# Patient Record
Sex: FEMALE | Race: BLACK OR AFRICAN AMERICAN | NOT HISPANIC OR LATINO | URBAN - METROPOLITAN AREA
[De-identification: names, ages, dates, MRNs, and addresses within clinical notes are randomized per-mention and may not be internally consistent; named-entity substitution may affect disease eponyms.]

---

## 2019-06-30 ENCOUNTER — HOSPITAL ENCOUNTER (INPATIENT)
Facility: HOSPITAL | Age: 69
LOS: 2 days | Discharge: HOME | DRG: 854 | End: 2019-07-03
Attending: EMERGENCY MEDICINE | Admitting: INTERNAL MEDICINE
Payer: COMMERCIAL

## 2019-06-30 DIAGNOSIS — N13.4 HYDROURETER: ICD-10-CM

## 2019-06-30 DIAGNOSIS — N13.30 HYDRONEPHROSIS: ICD-10-CM

## 2019-06-30 DIAGNOSIS — N12 PYELONEPHRITIS: Primary | ICD-10-CM

## 2019-06-30 DIAGNOSIS — R19.7 DIARRHEA: ICD-10-CM

## 2019-06-30 DIAGNOSIS — N17.9 AKI (ACUTE KIDNEY INJURY) (HCC): ICD-10-CM

## 2019-06-30 LAB
BASOPHILS # BLD AUTO: 0.05 THOUSANDS/ΜL (ref 0–0.1)
BASOPHILS NFR BLD AUTO: 0 % (ref 0–1)
EOSINOPHIL # BLD AUTO: 0 THOUSAND/ΜL (ref 0–0.61)
EOSINOPHIL NFR BLD AUTO: 0 % (ref 0–6)
ERYTHROCYTE [DISTWIDTH] IN BLOOD BY AUTOMATED COUNT: 13.5 % (ref 11.6–15.1)
HCT VFR BLD AUTO: 37.6 % (ref 34.8–46.1)
HGB BLD-MCNC: 12 G/DL (ref 11.5–15.4)
IMM GRANULOCYTES # BLD AUTO: 0.09 THOUSAND/UL (ref 0–0.2)
IMM GRANULOCYTES NFR BLD AUTO: 1 % (ref 0–2)
LACTATE SERPL-SCNC: 1.3 MMOL/L (ref 0.5–2)
LYMPHOCYTES # BLD AUTO: 0.73 THOUSANDS/ΜL (ref 0.6–4.47)
LYMPHOCYTES NFR BLD AUTO: 4 % (ref 14–44)
MCH RBC QN AUTO: 30.7 PG (ref 26.8–34.3)
MCHC RBC AUTO-ENTMCNC: 31.9 G/DL (ref 31.4–37.4)
MCV RBC AUTO: 96 FL (ref 82–98)
MONOCYTES # BLD AUTO: 1.37 THOUSAND/ΜL (ref 0.17–1.22)
MONOCYTES NFR BLD AUTO: 8 % (ref 4–12)
NEUTROPHILS # BLD AUTO: 15.23 THOUSANDS/ΜL (ref 1.85–7.62)
NEUTS SEG NFR BLD AUTO: 87 % (ref 43–75)
NRBC BLD AUTO-RTO: 0 /100 WBCS
PLATELET # BLD AUTO: 299 THOUSANDS/UL (ref 149–390)
PMV BLD AUTO: 9.7 FL (ref 8.9–12.7)
RBC # BLD AUTO: 3.91 MILLION/UL (ref 3.81–5.12)
WBC # BLD AUTO: 17.47 THOUSAND/UL (ref 4.31–10.16)

## 2019-06-30 PROCEDURE — 83605 ASSAY OF LACTIC ACID: CPT | Performed by: EMERGENCY MEDICINE

## 2019-06-30 PROCEDURE — 96361 HYDRATE IV INFUSION ADD-ON: CPT

## 2019-06-30 PROCEDURE — 99285 EMERGENCY DEPT VISIT HI MDM: CPT

## 2019-06-30 PROCEDURE — 99285 EMERGENCY DEPT VISIT HI MDM: CPT | Performed by: EMERGENCY MEDICINE

## 2019-06-30 PROCEDURE — 96375 TX/PRO/DX INJ NEW DRUG ADDON: CPT

## 2019-06-30 PROCEDURE — 80053 COMPREHEN METABOLIC PANEL: CPT | Performed by: EMERGENCY MEDICINE

## 2019-06-30 PROCEDURE — 36415 COLL VENOUS BLD VENIPUNCTURE: CPT | Performed by: EMERGENCY MEDICINE

## 2019-06-30 PROCEDURE — 96374 THER/PROPH/DIAG INJ IV PUSH: CPT

## 2019-06-30 PROCEDURE — 83690 ASSAY OF LIPASE: CPT | Performed by: EMERGENCY MEDICINE

## 2019-06-30 PROCEDURE — 85025 COMPLETE CBC W/AUTO DIFF WBC: CPT | Performed by: EMERGENCY MEDICINE

## 2019-06-30 RX ORDER — ONDANSETRON 2 MG/ML
4 INJECTION INTRAMUSCULAR; INTRAVENOUS ONCE
Status: COMPLETED | OUTPATIENT
Start: 2019-06-30 | End: 2019-06-30

## 2019-06-30 RX ORDER — PAROXETINE 30 MG/1
30 TABLET, FILM COATED ORAL EVERY MORNING
COMMUNITY

## 2019-06-30 RX ORDER — MORPHINE SULFATE 4 MG/ML
4 INJECTION, SOLUTION INTRAMUSCULAR; INTRAVENOUS ONCE
Status: COMPLETED | OUTPATIENT
Start: 2019-06-30 | End: 2019-06-30

## 2019-06-30 RX ADMIN — ONDANSETRON 4 MG: 2 INJECTION INTRAMUSCULAR; INTRAVENOUS at 23:19

## 2019-06-30 RX ADMIN — SODIUM CHLORIDE 1000 ML: 0.9 INJECTION, SOLUTION INTRAVENOUS at 23:18

## 2019-06-30 RX ADMIN — MORPHINE SULFATE 4 MG: 4 INJECTION INTRAVENOUS at 23:19

## 2019-07-01 ENCOUNTER — ANESTHESIA EVENT (INPATIENT)
Dept: PERIOP | Facility: HOSPITAL | Age: 69
DRG: 854 | End: 2019-07-01
Payer: COMMERCIAL

## 2019-07-01 ENCOUNTER — APPOINTMENT (EMERGENCY)
Dept: CT IMAGING | Facility: HOSPITAL | Age: 69
DRG: 854 | End: 2019-07-01
Payer: COMMERCIAL

## 2019-07-01 ENCOUNTER — ANESTHESIA (INPATIENT)
Dept: PERIOP | Facility: HOSPITAL | Age: 69
DRG: 854 | End: 2019-07-01
Payer: COMMERCIAL

## 2019-07-01 ENCOUNTER — APPOINTMENT (INPATIENT)
Dept: RADIOLOGY | Facility: HOSPITAL | Age: 69
DRG: 854 | End: 2019-07-01
Payer: COMMERCIAL

## 2019-07-01 PROBLEM — I10 HYPERTENSION: Status: ACTIVE | Noted: 2019-07-01

## 2019-07-01 PROBLEM — N13.30 HYDRONEPHROSIS: Status: ACTIVE | Noted: 2019-06-30

## 2019-07-01 PROBLEM — N13.4 HYDROURETER: Status: ACTIVE | Noted: 2019-06-30

## 2019-07-01 PROBLEM — A41.9 SEPSIS (HCC): Status: ACTIVE | Noted: 2019-07-01

## 2019-07-01 PROBLEM — F41.9 ANXIETY: Status: ACTIVE | Noted: 2019-07-01

## 2019-07-01 PROBLEM — E78.5 HYPERLIPIDEMIA: Status: ACTIVE | Noted: 2019-07-01

## 2019-07-01 PROBLEM — R79.89 ELEVATED SERUM CREATININE: Status: ACTIVE | Noted: 2019-07-01

## 2019-07-01 PROBLEM — N12 PYELONEPHRITIS: Status: ACTIVE | Noted: 2019-07-01

## 2019-07-01 PROBLEM — R73.09 ELEVATED GLUCOSE: Status: ACTIVE | Noted: 2019-07-01

## 2019-07-01 LAB
ALBUMIN SERPL BCP-MCNC: 2.6 G/DL (ref 3.5–5)
ALP SERPL-CCNC: 137 U/L (ref 46–116)
ALT SERPL W P-5'-P-CCNC: 19 U/L (ref 12–78)
ANION GAP SERPL CALCULATED.3IONS-SCNC: 15 MMOL/L (ref 4–13)
AST SERPL W P-5'-P-CCNC: 13 U/L (ref 5–45)
ATRIAL RATE: 112 BPM
BACTERIA UR QL AUTO: ABNORMAL /HPF
BILIRUB SERPL-MCNC: 1 MG/DL (ref 0.2–1)
BILIRUB UR QL STRIP: NEGATIVE
BUN SERPL-MCNC: 16 MG/DL (ref 5–25)
CALCIUM SERPL-MCNC: 9.5 MG/DL (ref 8.3–10.1)
CHLORIDE SERPL-SCNC: 99 MMOL/L (ref 100–108)
CLARITY UR: CLEAR
CLARITY, POC: NORMAL
CO2 SERPL-SCNC: 21 MMOL/L (ref 21–32)
COLOR UR: YELLOW
COLOR, POC: YELLOW
CREAT SERPL-MCNC: 1.42 MG/DL (ref 0.6–1.3)
GFR SERPL CREATININE-BSD FRML MDRD: 43 ML/MIN/1.73SQ M
GLUCOSE SERPL-MCNC: 136 MG/DL (ref 65–140)
GLUCOSE SERPL-MCNC: 182 MG/DL (ref 65–140)
GLUCOSE UR STRIP-MCNC: NEGATIVE MG/DL
HGB UR QL STRIP.AUTO: ABNORMAL
KETONES UR STRIP-MCNC: ABNORMAL MG/DL
LEUKOCYTE ESTERASE UR QL STRIP: ABNORMAL
LIPASE SERPL-CCNC: 67 U/L (ref 73–393)
NITRITE UR QL STRIP: POSITIVE
NON-SQ EPI CELLS URNS QL MICRO: ABNORMAL /HPF
P AXIS: 58 DEGREES
PH UR STRIP.AUTO: 5.5 [PH] (ref 4.5–8)
POTASSIUM SERPL-SCNC: 3.6 MMOL/L (ref 3.5–5.3)
PR INTERVAL: 142 MS
PROT SERPL-MCNC: 7.4 G/DL (ref 6.4–8.2)
PROT UR STRIP-MCNC: ABNORMAL MG/DL
QRS AXIS: 39 DEGREES
QRSD INTERVAL: 70 MS
QT INTERVAL: 326 MS
QTC INTERVAL: 444 MS
RBC #/AREA URNS AUTO: ABNORMAL /HPF
SODIUM SERPL-SCNC: 135 MMOL/L (ref 136–145)
SP GR UR STRIP.AUTO: 1.02 (ref 1–1.03)
T WAVE AXIS: 50 DEGREES
UROBILINOGEN UR QL STRIP.AUTO: 1 E.U./DL
VENTRICULAR RATE: 112 BPM
WBC #/AREA URNS AUTO: ABNORMAL /HPF

## 2019-07-01 PROCEDURE — BT1F1ZZ FLUOROSCOPY OF LEFT KIDNEY, URETER AND BLADDER USING LOW OSMOLAR CONTRAST: ICD-10-PCS | Performed by: UROLOGY

## 2019-07-01 PROCEDURE — 87186 SC STD MICRODIL/AGAR DIL: CPT

## 2019-07-01 PROCEDURE — 87086 URINE CULTURE/COLONY COUNT: CPT | Performed by: UROLOGY

## 2019-07-01 PROCEDURE — 87040 BLOOD CULTURE FOR BACTERIA: CPT | Performed by: EMERGENCY MEDICINE

## 2019-07-01 PROCEDURE — C1769 GUIDE WIRE: HCPCS | Performed by: UROLOGY

## 2019-07-01 PROCEDURE — 74176 CT ABD & PELVIS W/O CONTRAST: CPT

## 2019-07-01 PROCEDURE — 93010 ELECTROCARDIOGRAM REPORT: CPT | Performed by: INTERNAL MEDICINE

## 2019-07-01 PROCEDURE — 52332 CYSTOSCOPY AND TREATMENT: CPT | Performed by: UROLOGY

## 2019-07-01 PROCEDURE — 87086 URINE CULTURE/COLONY COUNT: CPT

## 2019-07-01 PROCEDURE — C2617 STENT, NON-COR, TEM W/O DEL: HCPCS | Performed by: UROLOGY

## 2019-07-01 PROCEDURE — 99222 1ST HOSP IP/OBS MODERATE 55: CPT | Performed by: UROLOGY

## 2019-07-01 PROCEDURE — 87077 CULTURE AEROBIC IDENTIFY: CPT

## 2019-07-01 PROCEDURE — 87077 CULTURE AEROBIC IDENTIFY: CPT | Performed by: EMERGENCY MEDICINE

## 2019-07-01 PROCEDURE — 87186 SC STD MICRODIL/AGAR DIL: CPT | Performed by: UROLOGY

## 2019-07-01 PROCEDURE — 99223 1ST HOSP IP/OBS HIGH 75: CPT | Performed by: INTERNAL MEDICINE

## 2019-07-01 PROCEDURE — 36415 COLL VENOUS BLD VENIPUNCTURE: CPT | Performed by: EMERGENCY MEDICINE

## 2019-07-01 PROCEDURE — 0T778DZ DILATION OF LEFT URETER WITH INTRALUMINAL DEVICE, VIA NATURAL OR ARTIFICIAL OPENING ENDOSCOPIC: ICD-10-PCS | Performed by: UROLOGY

## 2019-07-01 PROCEDURE — 87077 CULTURE AEROBIC IDENTIFY: CPT | Performed by: UROLOGY

## 2019-07-01 PROCEDURE — 93005 ELECTROCARDIOGRAM TRACING: CPT

## 2019-07-01 PROCEDURE — 82948 REAGENT STRIP/BLOOD GLUCOSE: CPT

## 2019-07-01 PROCEDURE — 87186 SC STD MICRODIL/AGAR DIL: CPT | Performed by: EMERGENCY MEDICINE

## 2019-07-01 PROCEDURE — 74420 UROGRAPHY RTRGR +-KUB: CPT

## 2019-07-01 PROCEDURE — 81001 URINALYSIS AUTO W/SCOPE: CPT

## 2019-07-01 DEVICE — VARIABLE LENGTH INJECTION STENT SET
Type: IMPLANTABLE DEVICE | Site: URETER | Status: FUNCTIONAL
Brand: CONTOUR VL™ INJECTION STENT SET

## 2019-07-01 RX ORDER — CILOSTAZOL 100 MG/1
100 TABLET ORAL 2 TIMES DAILY
COMMUNITY

## 2019-07-01 RX ORDER — ONDANSETRON 2 MG/ML
INJECTION INTRAMUSCULAR; INTRAVENOUS AS NEEDED
Status: DISCONTINUED | OUTPATIENT
Start: 2019-07-01 | End: 2019-07-01 | Stop reason: SURG

## 2019-07-01 RX ORDER — OXYCODONE HYDROCHLORIDE AND ACETAMINOPHEN 5; 325 MG/1; MG/1
1 TABLET ORAL EVERY 4 HOURS PRN
Status: DISCONTINUED | OUTPATIENT
Start: 2019-07-01 | End: 2019-07-01

## 2019-07-01 RX ORDER — ISOSORBIDE DINITRATE 30 MG/1
30 TABLET ORAL 4 TIMES DAILY
COMMUNITY

## 2019-07-01 RX ORDER — MIDAZOLAM HYDROCHLORIDE 1 MG/ML
INJECTION INTRAMUSCULAR; INTRAVENOUS AS NEEDED
Status: DISCONTINUED | OUTPATIENT
Start: 2019-07-01 | End: 2019-07-01 | Stop reason: SURG

## 2019-07-01 RX ORDER — FENTANYL CITRATE/PF 50 MCG/ML
50 SYRINGE (ML) INJECTION
Status: DISCONTINUED | OUTPATIENT
Start: 2019-07-01 | End: 2019-07-01 | Stop reason: HOSPADM

## 2019-07-01 RX ORDER — METOPROLOL TARTRATE 50 MG/1
50 TABLET, FILM COATED ORAL EVERY 12 HOURS SCHEDULED
Status: DISCONTINUED | OUTPATIENT
Start: 2019-07-01 | End: 2019-07-03 | Stop reason: HOSPADM

## 2019-07-01 RX ORDER — ACETAMINOPHEN 325 MG/1
650 TABLET ORAL EVERY 6 HOURS PRN
Status: DISCONTINUED | OUTPATIENT
Start: 2019-07-01 | End: 2019-07-03 | Stop reason: HOSPADM

## 2019-07-01 RX ORDER — HYDROMORPHONE HCL/PF 1 MG/ML
0.5 SYRINGE (ML) INJECTION
Status: DISCONTINUED | OUTPATIENT
Start: 2019-07-01 | End: 2019-07-01 | Stop reason: HOSPADM

## 2019-07-01 RX ORDER — OXYCODONE HYDROCHLORIDE AND ACETAMINOPHEN 5; 325 MG/1; MG/1
1 TABLET ORAL EVERY 4 HOURS PRN
Status: DISCONTINUED | OUTPATIENT
Start: 2019-07-01 | End: 2019-07-03 | Stop reason: HOSPADM

## 2019-07-01 RX ORDER — CEFAZOLIN SODIUM 2 G/50ML
2000 SOLUTION INTRAVENOUS EVERY 8 HOURS
Status: DISCONTINUED | OUTPATIENT
Start: 2019-07-01 | End: 2019-07-01

## 2019-07-01 RX ORDER — OXYCODONE HYDROCHLORIDE 5 MG/1
5 TABLET ORAL EVERY 4 HOURS PRN
Status: DISCONTINUED | OUTPATIENT
Start: 2019-07-01 | End: 2019-07-03 | Stop reason: HOSPADM

## 2019-07-01 RX ORDER — DEXAMETHASONE SODIUM PHOSPHATE 4 MG/ML
INJECTION, SOLUTION INTRA-ARTICULAR; INTRALESIONAL; INTRAMUSCULAR; INTRAVENOUS; SOFT TISSUE AS NEEDED
Status: DISCONTINUED | OUTPATIENT
Start: 2019-07-01 | End: 2019-07-01 | Stop reason: SURG

## 2019-07-01 RX ORDER — FENTANYL CITRATE 50 UG/ML
INJECTION, SOLUTION INTRAMUSCULAR; INTRAVENOUS AS NEEDED
Status: DISCONTINUED | OUTPATIENT
Start: 2019-07-01 | End: 2019-07-01 | Stop reason: SURG

## 2019-07-01 RX ORDER — ALBUTEROL SULFATE 2.5 MG/3ML
2.5 SOLUTION RESPIRATORY (INHALATION) ONCE
Status: COMPLETED | OUTPATIENT
Start: 2019-07-01 | End: 2019-07-01

## 2019-07-01 RX ORDER — OMEPRAZOLE 40 MG/1
40 CAPSULE, DELAYED RELEASE ORAL
COMMUNITY

## 2019-07-01 RX ORDER — POLYETHYLENE GLYCOL 3350 17 G/17G
17 POWDER, FOR SOLUTION ORAL DAILY PRN
Status: DISCONTINUED | OUTPATIENT
Start: 2019-07-01 | End: 2019-07-03 | Stop reason: HOSPADM

## 2019-07-01 RX ORDER — CEFAZOLIN SODIUM 2 G/50ML
2000 SOLUTION INTRAVENOUS ONCE
Status: COMPLETED | OUTPATIENT
Start: 2019-07-01 | End: 2019-07-01

## 2019-07-01 RX ORDER — MAGNESIUM HYDROXIDE/ALUMINUM HYDROXICE/SIMETHICONE 120; 1200; 1200 MG/30ML; MG/30ML; MG/30ML
30 SUSPENSION ORAL EVERY 6 HOURS PRN
Status: DISCONTINUED | OUTPATIENT
Start: 2019-07-01 | End: 2019-07-03 | Stop reason: HOSPADM

## 2019-07-01 RX ORDER — ONDANSETRON 2 MG/ML
4 INJECTION INTRAMUSCULAR; INTRAVENOUS EVERY 4 HOURS PRN
Status: DISCONTINUED | OUTPATIENT
Start: 2019-07-01 | End: 2019-07-03 | Stop reason: HOSPADM

## 2019-07-01 RX ORDER — METOPROLOL TARTRATE 50 MG/1
50 TABLET, FILM COATED ORAL EVERY 12 HOURS SCHEDULED
COMMUNITY

## 2019-07-01 RX ORDER — SODIUM CHLORIDE 9 MG/ML
100 INJECTION, SOLUTION INTRAVENOUS CONTINUOUS
Status: DISPENSED | OUTPATIENT
Start: 2019-07-01 | End: 2019-07-02

## 2019-07-01 RX ORDER — PANTOPRAZOLE SODIUM 40 MG/1
40 TABLET, DELAYED RELEASE ORAL
Status: DISCONTINUED | OUTPATIENT
Start: 2019-07-01 | End: 2019-07-03 | Stop reason: HOSPADM

## 2019-07-01 RX ORDER — MEPERIDINE HYDROCHLORIDE 50 MG/ML
12.5 INJECTION INTRAMUSCULAR; INTRAVENOUS; SUBCUTANEOUS ONCE AS NEEDED
Status: DISCONTINUED | OUTPATIENT
Start: 2019-07-01 | End: 2019-07-01 | Stop reason: HOSPADM

## 2019-07-01 RX ORDER — ONDANSETRON 2 MG/ML
4 INJECTION INTRAMUSCULAR; INTRAVENOUS EVERY 6 HOURS PRN
Status: DISCONTINUED | OUTPATIENT
Start: 2019-07-01 | End: 2019-07-01

## 2019-07-01 RX ORDER — PAROXETINE HYDROCHLORIDE 20 MG/1
30 TABLET, FILM COATED ORAL EVERY MORNING
Status: DISCONTINUED | OUTPATIENT
Start: 2019-07-01 | End: 2019-07-03 | Stop reason: HOSPADM

## 2019-07-01 RX ORDER — MAGNESIUM HYDROXIDE 1200 MG/15ML
LIQUID ORAL AS NEEDED
Status: DISCONTINUED | OUTPATIENT
Start: 2019-07-01 | End: 2019-07-01 | Stop reason: HOSPADM

## 2019-07-01 RX ORDER — TRAMADOL HYDROCHLORIDE 50 MG/1
50 TABLET ORAL EVERY 6 HOURS PRN
COMMUNITY

## 2019-07-01 RX ORDER — ONDANSETRON 2 MG/ML
4 INJECTION INTRAMUSCULAR; INTRAVENOUS ONCE AS NEEDED
Status: DISCONTINUED | OUTPATIENT
Start: 2019-07-01 | End: 2019-07-01 | Stop reason: HOSPADM

## 2019-07-01 RX ORDER — PROPOFOL 10 MG/ML
INJECTION, EMULSION INTRAVENOUS AS NEEDED
Status: DISCONTINUED | OUTPATIENT
Start: 2019-07-01 | End: 2019-07-01 | Stop reason: SURG

## 2019-07-01 RX ORDER — LISINOPRIL 5 MG/1
5 TABLET ORAL DAILY
COMMUNITY
End: 2019-07-03 | Stop reason: HOSPADM

## 2019-07-01 RX ORDER — CILOSTAZOL 50 MG/1
100 TABLET ORAL 2 TIMES DAILY
Status: DISCONTINUED | OUTPATIENT
Start: 2019-07-01 | End: 2019-07-03 | Stop reason: HOSPADM

## 2019-07-01 RX ORDER — ATORVASTATIN CALCIUM 40 MG/1
80 TABLET, FILM COATED ORAL
Status: DISCONTINUED | OUTPATIENT
Start: 2019-07-01 | End: 2019-07-03 | Stop reason: HOSPADM

## 2019-07-01 RX ORDER — ATORVASTATIN CALCIUM 80 MG/1
80 TABLET, FILM COATED ORAL
COMMUNITY

## 2019-07-01 RX ORDER — TRAMADOL HYDROCHLORIDE 50 MG/1
50 TABLET ORAL EVERY 6 HOURS PRN
Status: DISCONTINUED | OUTPATIENT
Start: 2019-07-01 | End: 2019-07-03 | Stop reason: HOSPADM

## 2019-07-01 RX ADMIN — IOHEXOL 35 ML: 240 INJECTION, SOLUTION INTRATHECAL; INTRAVASCULAR; INTRAVENOUS; ORAL at 18:09

## 2019-07-01 RX ADMIN — LIDOCAINE HYDROCHLORIDE 60 MG: 20 INJECTION, SOLUTION INTRAVENOUS at 16:42

## 2019-07-01 RX ADMIN — FENTANYL CITRATE 50 MCG: 50 INJECTION INTRAMUSCULAR; INTRAVENOUS at 17:40

## 2019-07-01 RX ADMIN — SODIUM CHLORIDE 1000 ML: 0.9 INJECTION, SOLUTION INTRAVENOUS at 02:55

## 2019-07-01 RX ADMIN — ISOSORBIDE DINITRATE 30 MG: 20 TABLET ORAL at 18:19

## 2019-07-01 RX ADMIN — CEFAZOLIN SODIUM 2000 MG: 2 SOLUTION INTRAVENOUS at 03:00

## 2019-07-01 RX ADMIN — FENTANYL CITRATE 100 MCG: 50 INJECTION, SOLUTION INTRAMUSCULAR; INTRAVENOUS at 16:55

## 2019-07-01 RX ADMIN — PANTOPRAZOLE SODIUM 40 MG: 40 TABLET, DELAYED RELEASE ORAL at 05:49

## 2019-07-01 RX ADMIN — ACETAMINOPHEN 650 MG: 325 TABLET ORAL at 09:06

## 2019-07-01 RX ADMIN — ONDANSETRON HYDROCHLORIDE 4 MG: 2 INJECTION, SOLUTION INTRAVENOUS at 16:45

## 2019-07-01 RX ADMIN — DEXAMETHASONE SODIUM PHOSPHATE 4 MG: 4 INJECTION, SOLUTION INTRAMUSCULAR; INTRAVENOUS at 16:45

## 2019-07-01 RX ADMIN — CEFAZOLIN SODIUM 2000 MG: 2 SOLUTION INTRAVENOUS at 16:45

## 2019-07-01 RX ADMIN — SODIUM CHLORIDE 100 ML/HR: 0.9 INJECTION, SOLUTION INTRAVENOUS at 15:14

## 2019-07-01 RX ADMIN — FENTANYL CITRATE 50 MCG: 50 INJECTION, SOLUTION INTRAMUSCULAR; INTRAVENOUS at 16:51

## 2019-07-01 RX ADMIN — ACETAMINOPHEN 650 MG: 325 TABLET ORAL at 23:15

## 2019-07-01 RX ADMIN — CEFAZOLIN SODIUM 2000 MG: 2 SOLUTION INTRAVENOUS at 12:04

## 2019-07-01 RX ADMIN — CILOSTAZOL 100 MG: 50 TABLET ORAL at 18:20

## 2019-07-01 RX ADMIN — FENTANYL CITRATE 50 MCG: 50 INJECTION, SOLUTION INTRAMUSCULAR; INTRAVENOUS at 16:42

## 2019-07-01 RX ADMIN — MIDAZOLAM 2 MG: 1 INJECTION INTRAMUSCULAR; INTRAVENOUS at 16:38

## 2019-07-01 RX ADMIN — ISOSORBIDE DINITRATE 30 MG: 20 TABLET ORAL at 22:28

## 2019-07-01 RX ADMIN — METOPROLOL TARTRATE 50 MG: 50 TABLET, FILM COATED ORAL at 22:28

## 2019-07-01 RX ADMIN — ALBUTEROL SULFATE 2.5 MG: 2.5 SOLUTION RESPIRATORY (INHALATION) at 16:28

## 2019-07-01 RX ADMIN — PROPOFOL 150 MG: 10 INJECTION, EMULSION INTRAVENOUS at 16:42

## 2019-07-01 RX ADMIN — SODIUM CHLORIDE 100 ML/HR: 0.9 INJECTION, SOLUTION INTRAVENOUS at 03:32

## 2019-07-01 RX ADMIN — METOPROLOL TARTRATE 50 MG: 50 TABLET, FILM COATED ORAL at 09:07

## 2019-07-01 RX ADMIN — PIPERACILLIN SODIUM AND TAZOBACTAM SODIUM 3.38 G: 36; 4.5 INJECTION, POWDER, FOR SOLUTION INTRAVENOUS at 22:22

## 2019-07-01 RX ADMIN — ISOSORBIDE DINITRATE 30 MG: 20 TABLET ORAL at 09:06

## 2019-07-01 RX ADMIN — ATORVASTATIN CALCIUM 80 MG: 40 TABLET, FILM COATED ORAL at 18:20

## 2019-07-01 NOTE — H&P
[address] [facility]  H&P- Carmela Caballero 1950, 71 y o  female MRN: [id number]    Unit/Bed#: E2 -01 Encounter: [id number]    Primary Care Provider: No primary care provider on file  Date and time admitted to hospital: 6/30/2019 10:23 PM        * Sepsis [facility])  Assessment & Plan  Patient presents with tachycardia, tachypnea, leukocytosis   Source likely pyelonephritis   CT abdomen/pelvis showing moderate left hydronephrosis and hydroureter with perinephric and periuretheral stranding  Associated nausea, vomiting, abdominal pain and diarrhea, c/o frequency, urgency, dysuria   Given dose of IV Ancef in ED, will continue Q8hrs and consult urology for further recommendations   PRN Zofran, tylenol, Ultram and morphine   Blood cultures pending, lactic negative   IV fluid hydration     Elevated glucose  Assessment & Plan  Glucose elevated to 182 at time of admission  Patient reports she was diagnosed with "borderline diabetes"   Does not take medications at home for diabetes  Will check A1C and place on diabetic diet       Anxiety  Assessment & Plan  Continue home Paxil     Elevated serum creatinine  Assessment & Plan  Creatinine elevated to 1 42 at time of admission  Unknown baseline   Consider secondary to dehydration vs  Kidney infection   Will continue to trend BMP with IV fluid hydration and IV antibiotics       Pyelonephritis  Assessment & Plan  CT evidence of pyelonephritis   IV antibiotics, IV fluid hydration   Urology consult     Hyperlipidemia  Assessment & Plan  Continue home statin    Hypertension  Assessment & Plan  Patient reports history of HTN  BP appears stable at this time  Will hold home lisinopril for GENO  Continue home Lopressor and continue to monitor         VTE Prophylaxis: Enoxaparin (Lovenox)  / sequential compression device   Code Status: full code  POLST: POLST form is not discussed and not completed at this time    Discussion with family: sister at bedside    Anticipated Length of Stay:  Patient will be admitted on an Inpatient basis with an anticipated length of stay of  More than 2 midnights  Justification for Hospital Stay: urosepsis     Total Time for Visit, including Counseling / Coordination of Care: 45 minutes  Greater than 50% of this total time spent on direct patient counseling and coordination of care  Chief Complaint:   Abdominal pain    History of Present Illness:    Barry Woodruff is a 71 y o  female with a PMHx of HTN, HLD, anxiety, PAD who presents with abdominal pain  Patient reports two days of progressively worsening abdominal and back pain  Associated nausea, diarrhea, vomiting  Also reporting urinary frequency, urgency, dysuria  Reports pain is constant and sharp, patient notes worsening pain with urination  Reports some relief of pain with morphine given in ED  Patient describes the pain as diffuse in her entire abdomen and back, radiates down her arms when she urinates  Denies known history of urinary infections  Visiting family in [place], lives in [place] and reports all her doctors are there  Review of Systems:    Review of Systems   Constitutional: Positive for fatigue  Negative for activity change and fever  HENT: Negative for ear pain, nosebleeds, sinus pressure, sneezing, sore throat and trouble swallowing  Eyes: Negative for photophobia, pain and visual disturbance  Respiratory: Negative for cough, chest tightness, shortness of breath and wheezing  Cardiovascular: Negative for chest pain, palpitations and leg swelling  Gastrointestinal: Positive for abdominal pain, diarrhea, nausea and vomiting  Endocrine: Negative  Genitourinary: Positive for dysuria, flank pain, frequency and urgency  Negative for hematuria  Musculoskeletal: Positive for back pain  Negative for gait problem and neck stiffness  Skin: Negative for rash  Allergic/Immunologic: Negative  Neurological: Positive for weakness   Negative for syncope, speech difficulty and light-headedness  Hematological: Negative  Psychiatric/Behavioral: Negative for confusion and sleep disturbance  All other systems reviewed and are negative  Past Medical and Surgical History:     Past Medical History:   Diagnosis Date    Asthma     Diabetes mellitus ([address] )     Hyperlipidemia     Hypertension        History reviewed  No pertinent surgical history  Meds/Allergies:    Prior to Admission medications    Medication Sig Start Date End Date Taking? Authorizing Provider   atorvastatin (LIPITOR) 80 mg tablet Take 80 mg by mouth daily with dinner   Yes Historical Provider, MD   cilostazol (PLETAL) 100 mg tablet Take 100 mg by mouth 2 (two) times a day   Yes Historical Provider, MD   isosorbide dinitrate (ISORDIL) 30 mg tablet Take 30 mg by mouth 4 (four) times a day   Yes Historical Provider, MD   lisinopril (ZESTRIL) 5 mg tablet Take 5 mg by mouth daily   Yes Historical Provider, MD   metoprolol tartrate (LOPRESSOR) 50 mg tablet Take 50 mg by mouth every 12 (twelve) hours   Yes Historical Provider, MD   omeprazole (PriLOSEC) 40 MG capsule Take 40 mg by mouth daily in the early morning   Yes Historical Provider, MD   PARoxetine (PAXIL) 30 mg tablet Take 30 mg by mouth every morning   Yes Historical Provider, MD   traMADol (ULTRAM) 50 mg tablet Take 50 mg by mouth every 6 (six) hours as needed for moderate pain   Yes Historical Provider, MD     I have reviewed home medications with patient personally      Allergies: No Known Allergies    Social History:     Marital Status: Unknown   Occupation: unknown  Patient Pre-hospital Living Situation: home in [place]  Patient Pre-hospital Level of Mobility: ambulatory  Patient Pre-hospital Diet Restrictions: prediabetic   Substance Use History:   Social History     Substance and Sexual Activity   Alcohol Use Not Currently     Social History     Tobacco Use   Smoking Status Former Smoker   Smokeless Tobacco Never Used     Social History Substance and Sexual Activity   Drug Use Never       Family History:    History reviewed  No pertinent family history  Physical Exam:     Vitals:   Blood Pressure: 159/73 (07/01/19 0331)  Pulse: (!) 112 (07/01/19 0331)  Temperature: 98 6 °F (37 °C) (07/01/19 0331)  Temp Source: Tympanic (07/01/19 0331)  Respirations: 20 (07/01/19 0331)  Height: 5' 11" (180 3 cm) (07/01/19 0331)  Weight - Scale: 91 kg (200 lb 9 9 oz) (07/01/19 0331)  SpO2: 96 % (07/01/19 0331)    Physical Exam   Constitutional: She is oriented to person, place, and time  She appears ill  HENT:   Head: Normocephalic and atraumatic  Mouth/Throat: Oropharynx is clear and moist    Eyes: Conjunctivae and EOM are normal  No scleral icterus  Neck: Normal range of motion  Cardiovascular: Regular rhythm and normal heart sounds  Tachycardia present  No murmur heard  Pulmonary/Chest: Effort normal and breath sounds normal  No respiratory distress  She has no wheezes  She has no rales  Abdominal: Soft  She exhibits no distension  Bowel sounds are decreased  There is tenderness (diffuse)  There is CVA tenderness  There is no rebound and no guarding  Well healed abdominal scar   Musculoskeletal: Normal range of motion  She exhibits no edema  Neurological: She is alert and oriented to person, place, and time  Skin: Skin is warm and dry  Psychiatric: She has a normal mood and affect  Her behavior is normal  Thought content normal        Additional Data:     Lab Results: I have personally reviewed pertinent reports        Results from last 7 days   Lab Units 06/30/19  2316   WBC Thousand/uL 17 47*   HEMOGLOBIN g/dL 12 0   HEMATOCRIT % 37 6   PLATELETS Thousands/uL 299   NEUTROS PCT % 87*   LYMPHS PCT % 4*   MONOS PCT % 8   EOS PCT % 0     Results from last 7 days   Lab Units 06/30/19  2358   SODIUM mmol/L 135*   POTASSIUM mmol/L 3 6   CHLORIDE mmol/L 99*   CO2 mmol/L 21   BUN mg/dL 16   CREATININE mg/dL 1 42*   ANION GAP mmol/L 15* CALCIUM mg/dL 9 5   ALBUMIN g/dL 2 6*   TOTAL BILIRUBIN mg/dL 1 00   ALK PHOS U/L 137*   ALT U/L 19   AST U/L 13   GLUCOSE RANDOM mg/dL 182*                 Results from last 7 days   Lab Units 06/30/19  2316   LACTIC ACID mmol/L 1 3       Imaging: I have personally reviewed pertinent reports  CT abdomen pelvis wo contrast   Final Result by Darrian Roy MD (07/01 0216)      Moderate left hydronephrosis and hydroureter with perinephric and periureteral stranding which may be seen in the setting of pyelonephritis  Correlate with urinalysis and costovertebral angle tenderness  Workstation performed: [id number]             EKG, Pathology, and Other Studies Reviewed on Admission:   · EKG: sinus tach    Allscripts / Epic Records Reviewed: Yes     ** Please Note: This note has been constructed using a voice recognition system   **

## 2019-07-01 NOTE — ED PROVIDER NOTES
History  Chief Complaint   Patient presents with    Abdominal Pain     Pt c/o generalized abdominal pain and one episode vomiting; also c/o diarrhea 3-4 days ago; Pt also c/o pain and burning with urination and increase frequency with urination that began 3 days ago    Vomiting    Possible UTI     71year old female presents for evaluation of diffuse burning pain for the past 3 days  Started gradually, is constant, severe, nonradiating, without modifying factors  Associated with nausea, multiple episodes of nonbloody nonbilious vomitus, diarrhea without blood or mucus, chills, dysuria  She denies fevers, anorexia, back/flank pain  History provided by:  Patient      Prior to Admission Medications   Prescriptions Last Dose Informant Patient Reported? Taking? PARoxetine (PAXIL) 30 mg tablet   Yes Yes   Sig: Take 30 mg by mouth every morning   atorvastatin (LIPITOR) 80 mg tablet   Yes Yes   Sig: Take 80 mg by mouth daily with dinner   cilostazol (PLETAL) 100 mg tablet   Yes Yes   Sig: Take 100 mg by mouth 2 (two) times a day   isosorbide dinitrate (ISORDIL) 30 mg tablet   Yes Yes   Sig: Take 30 mg by mouth 4 (four) times a day   lisinopril (ZESTRIL) 5 mg tablet   Yes Yes   Sig: Take 5 mg by mouth daily   metoprolol tartrate (LOPRESSOR) 50 mg tablet   Yes Yes   Sig: Take 50 mg by mouth every 12 (twelve) hours   omeprazole (PriLOSEC) 40 MG capsule   Yes Yes   Sig: Take 40 mg by mouth daily in the early morning   traMADol (ULTRAM) 50 mg tablet   Yes Yes   Sig: Take 50 mg by mouth every 6 (six) hours as needed for moderate pain      Facility-Administered Medications: None       Past Medical History:   Diagnosis Date    Asthma     Diabetes mellitus ([facility] [address] )     Hyperlipidemia     Hypertension        History reviewed  No pertinent surgical history  History reviewed  No pertinent family history  I have reviewed and agree with the history as documented      Social History     Tobacco Use    Smoking status: Former Smoker    Smokeless tobacco: Never Used   Substance Use Topics    Alcohol use: Not Currently    Drug use: Never        Review of Systems   Constitutional: Negative for appetite change, diaphoresis, fatigue and fever  HENT: Negative for congestion, dental problem and rhinorrhea  Eyes: Negative for pain  Respiratory: Negative for chest tightness and shortness of breath  Cardiovascular: Negative for chest pain and leg swelling  Gastrointestinal: Positive for abdominal pain, diarrhea, nausea and vomiting  Negative for blood in stool and constipation  Endocrine: Negative for polydipsia, polyphagia and polyuria  Genitourinary: Positive for dysuria  Negative for difficulty urinating, dyspareunia, enuresis, flank pain, frequency, hematuria, pelvic pain, vaginal bleeding and vaginal discharge  Musculoskeletal: Negative for arthralgias, back pain and myalgias  Skin: Negative for color change and rash  Neurological: Negative for dizziness, weakness, light-headedness and headaches  Psychiatric/Behavioral: Negative for hallucinations and suicidal ideas  Physical Exam  Physical Exam   Constitutional: She is oriented to person, place, and time  She appears well-developed and well-nourished  HENT:   Mouth/Throat: No oropharyngeal exudate  TMs normal bilaterally no pharyngeal erythema no rhinorrhea nontender palpation of sinuses, normal looking turbinates   Eyes: Conjunctivae and EOM are normal    Neck: Normal range of motion  Neck supple  No meningeal signs   Cardiovascular: Normal rate, regular rhythm, normal heart sounds and intact distal pulses  Pulmonary/Chest: Effort normal and breath sounds normal  No respiratory distress  She has no wheezes  She has no rales  She exhibits no tenderness  Abdominal: Soft  Bowel sounds are normal  She exhibits no distension and no mass  There is generalized tenderness  There is rigidity  There is no rebound and no guarding  No hernia     No cvat Musculoskeletal: Normal range of motion  She exhibits no edema  Lymphadenopathy:     She has no cervical adenopathy  Neurological: She is alert and oriented to person, place, and time  No cranial nerve deficit  Skin: No rash noted  No erythema  No edema   Psychiatric: She has a normal mood and affect  Her behavior is normal    Nursing note and vitals reviewed        Vital Signs  ED Triage Vitals   Temperature Pulse Respirations Blood Pressure SpO2   06/30/19 2221 06/30/19 2221 06/30/19 2221 06/30/19 2221 06/30/19 2221   98 °F (36 7 °C) (!) 132 20 146/72 95 %      Temp Source Heart Rate Source Patient Position - Orthostatic VS BP Location FiO2 (%)   06/30/19 2221 06/30/19 2221 07/01/19 0033 07/01/19 0033 --   Oral Monitor Lying Right arm       Pain Score       06/30/19 2221       5           Vitals:    07/01/19 1845 07/01/19 1915 07/01/19 2300 07/02/19 0300   BP: 116/61 103/59 122/60 126/76   Pulse: 92 91 92 78   Patient Position - Orthostatic VS: Lying Lying Lying Lying         Visual Acuity      ED Medications  Medications   atorvastatin (LIPITOR) tablet 80 mg (80 mg Oral Given 7/1/19 1820)   cilostazol (PLETAL) tablet 100 mg (100 mg Oral Given 7/1/19 1820)   isosorbide dinitrate (ISORDIL) tablet 30 mg (30 mg Oral Given 7/1/19 2228)   metoprolol tartrate (LOPRESSOR) tablet 50 mg (50 mg Oral Given 7/1/19 2228)   pantoprazole (PROTONIX) EC tablet 40 mg (40 mg Oral Given 7/2/19 0545)   PARoxetine (PAXIL) tablet 30 mg (0 mg Oral Hold 7/1/19 0904)   traMADol (ULTRAM) tablet 50 mg (has no administration in time range)   sodium chloride 0 9 % infusion (100 mL/hr Intravenous Restarted 7/1/19 1913)   acetaminophen (TYLENOL) tablet 650 mg (650 mg Oral Given 7/1/19 2315)   polyethylene glycol (MIRALAX) packet 17 g ( Oral MAR Unhold 7/1/19 1807)   aluminum-magnesium hydroxide-simethicone (MYLANTA) 200-200-20 mg/5 mL oral suspension 30 mL ( Oral MAR Unhold 7/1/19 3419)   enoxaparin (LOVENOX) subcutaneous injection 40 mg ( Subcutaneous MAR Unhold 7/1/19 1807)   oxyCODONE (ROXICODONE) IR tablet 5 mg ( Oral MAR Unhold 7/1/19 1807)   morphine injection 2 mg ( Intravenous MAR Unhold 7/1/19 1807)   ondansetron (ZOFRAN) injection 4 mg ( Intravenous MAR Unhold 7/1/19 1807)   piperacillin-tazobactam (ZOSYN) 3 375 g in sodium chloride 0 9 % 50 mL IVPB (3 375 g Intravenous New Bag 7/2/19 0544)   oxyCODONE-acetaminophen (PERCOCET) 5-325 mg per tablet 1 tablet (has no administration in time range)   sodium chloride 0 9 % bolus 1,000 mL (0 mL Intravenous Stopped 7/1/19 0034)   morphine (PF) 4 mg/mL injection 4 mg (4 mg Intravenous Given 6/30/19 2319)   ondansetron (ZOFRAN) injection 4 mg (4 mg Intravenous Given 6/30/19 2319)   ceFAZolin (ANCEF) IVPB (premix) 2,000 mg (0 mg Intravenous Stopped 7/1/19 0318)   sodium chloride 0 9 % bolus 1,000 mL (0 mL Intravenous Stopped 7/1/19 0546)   albuterol inhalation solution 2 5 mg (2 5 mg Nebulization Given 7/1/19 1628)   iohexol (OMNIPAQUE) 240 MG/ML solution 35 mL (35 mL Injection Given 7/1/19 1809)       Diagnostic Studies  Results Reviewed     Procedure Component Value Units Date/Time    Blood culture #2 [[id number]]  (Abnormal) Collected:  07/01/19 0252    Lab Status:  Preliminary result Specimen:  Blood from Hand, Right Updated:  07/01/19 1910     Gram Stain Result Gram negative rods    Blood culture #1 [[id number]] Collected:  07/01/19 0255    Lab Status: In process Specimen:  Blood from Hand, Left Updated:  07/01/19 0258    Urine Microscopic [[id number]]  (Abnormal) Collected:  07/01/19 0134    Lab Status:  Final result Specimen:  Urine, Clean Catch Updated:  07/01/19 0137     RBC, UA 4-10 /hpf      WBC, UA Innumerable /hpf      Epithelial Cells Occasional /hpf      Bacteria, UA Innumerable /hpf     Urine culture [[id number]] Collected:  07/01/19 0134    Lab Status:   In process Specimen:  Urine, Clean Catch Updated:  07/01/19 0137    POCT urinalysis dipstick [[id number]]  (Normal) Resulted: 07/01/19 0124    Lab Status:  Final result Specimen:  Urine Updated:  07/01/19 0125     Color, UA yellow     Clarity, UA cloudy    ED Urine Macroscopic [[id number]]  (Abnormal) Collected:  07/01/19 0134    Lab Status:  Final result Specimen:  Urine Updated:  07/01/19 0124     Color, UA Yellow     Clarity, UA Clear     pH, UA 5 5     Leukocytes, UA Large     Nitrite, UA Positive     Protein,  (2+) mg/dl      Glucose, UA Negative mg/dl      Ketones, UA 40 (2+) mg/dl      Urobilinogen, UA 1 0 E U /dl      Bilirubin, UA Negative     Blood, UA Moderate     Specific [place], UA 1 020    Narrative:       CLINITEK RESULT    Comprehensive metabolic panel [[id number]]  (Abnormal) Collected:  06/30/19 2358    Lab Status:  Final result Specimen:  Blood from Hand, Right Updated:  07/01/19 0017     Sodium 135 mmol/L      Potassium 3 6 mmol/L      Chloride 99 mmol/L      CO2 21 mmol/L      ANION GAP 15 mmol/L      BUN 16 mg/dL      Creatinine 1 42 mg/dL      Glucose 182 mg/dL      Calcium 9 5 mg/dL      AST 13 U/L      ALT 19 U/L      Alkaline Phosphatase 137 U/L      Total Protein 7 4 g/dL      Albumin 2 6 g/dL      Total Bilirubin 1 00 mg/dL      eGFR 43 ml/min/1 73sq m     Narrative:       [place] guidelines for Chronic Kidney Disease (CKD):     Stage 1 with normal or high GFR (GFR > 90 mL/min/1 73 square meters)    Stage 2 Mild CKD (GFR = 60-89 mL/min/1 73 square meters)    Stage 3A Moderate CKD (GFR = 45-59 mL/min/1 73 square meters)    Stage 3B Moderate CKD (GFR = 30-44 mL/min/1 73 square meters)    Stage 4 Severe CKD (GFR = 15-29 mL/min/1 73 square meters)    Stage 5 End Stage CKD (GFR <15 mL/min/1 73 square meters)  Note: GFR calculation is accurate only with a steady state creatinine    Lipase [[id number]]  (Abnormal) Collected:  06/30/19 2358    Lab Status:  Final result Specimen:  Blood from Hand, Right Updated:  07/01/19 0017     Lipase 67 u/L     Lactic acid, plasma [[id number]] (Normal) Collected:  06/30/19 2316    Lab Status:  Final result Specimen:  Blood from Hand, Left Updated:  06/30/19 2346     LACTIC ACID 1 3 mmol/L     Narrative:       Result may be elevated if tourniquet was used during collection  CBC and differential [[id number]]  (Abnormal) Collected:  06/30/19 2316    Lab Status:  Final result Specimen:  Blood from Hand, Left Updated:  06/30/19 2325     WBC 17 47 Thousand/uL      RBC 3 91 Million/uL      Hemoglobin 12 0 g/dL      Hematocrit 37 6 %      MCV 96 fL      MCH 30 7 pg      MCHC 31 9 g/dL      RDW 13 5 %      MPV 9 7 fL      Platelets 300 Thousands/uL      nRBC 0 /100 WBCs      Neutrophils Relative 87 %      Immat GRANS % 1 %      Lymphocytes Relative 4 %      Monocytes Relative 8 %      Eosinophils Relative 0 %      Basophils Relative 0 %      Neutrophils Absolute 15 23 Thousands/µL      Immature Grans Absolute 0 09 Thousand/uL      Lymphocytes Absolute 0 73 Thousands/µL      Monocytes Absolute 1 37 Thousand/µL      Eosinophils Absolute 0 00 Thousand/µL      Basophils Absolute 0 05 Thousands/µL                  CT abdomen pelvis wo contrast   Final Result by Leslye Nieto MD (07/01 0216)      Moderate left hydronephrosis and hydroureter with perinephric and periureteral stranding which may be seen in the setting of pyelonephritis  Correlate with urinalysis and costovertebral angle tenderness  Workstation performed: [id number]         FL retrograde pyelogram    (Results Pending)              Procedures  Procedures       ED Course                               MDM  Number of Diagnoses or Management Options  Diagnosis management comments: Serial abdominal pain, nausea vomiting, diarrhea with diffuse abdominal tenderness palpation-will check abdominal labs, CT abdomen pelvis read acute pathology        Disposition  Final diagnoses:   Pyelonephritis   GENO (acute kidney injury) ([facility] [address] )   Hydronephrosis   Hydroureter     Time reflects when diagnosis was documented in both MDM as applicable and the Disposition within this note     Time User Action Codes Description Comment    7/1/2019  2:40 AM Natgenaro Macadamia Add [N12] Pyelonephritis     7/1/2019  2:41 AM Borisburak Elan Suleman Add [N17 9] GENO (acute kidney injury) ([facility] [address] )     7/1/2019  2:41 AM Nathanel Macadamia Add [N13 30] Hydronephrosis     7/1/2019  2:41 AM Nathanel Macadamia Add [N13 4] Hydroureter     7/1/2019  4:57 PM Camila Denis Modify [N13 30] Hydronephrosis     7/1/2019  4:57 PM Camila Denis Modify [N13 4] Hydroureter     7/1/2019  5:07 PM Leslee Ying Modify [N13 30] Hydronephrosis     7/1/2019  5:07 PM Leslee Ying Modify [N13 4] Hydroureter       ED Disposition     ED Disposition Condition Date/Time Comment    Admit Stable Mon Jul 1, 2019  2:40 AM Case was discussed with JESUS and the patient's admission status was agreed to be Admission Status: inpatient status to the service of Dr Ariel Springer   Follow-up Information    None         Current Discharge Medication List      CONTINUE these medications which have NOT CHANGED    Details   atorvastatin (LIPITOR) 80 mg tablet Take 80 mg by mouth daily with dinner      cilostazol (PLETAL) 100 mg tablet Take 100 mg by mouth 2 (two) times a day      isosorbide dinitrate (ISORDIL) 30 mg tablet Take 30 mg by mouth 4 (four) times a day      lisinopril (ZESTRIL) 5 mg tablet Take 5 mg by mouth daily      metoprolol tartrate (LOPRESSOR) 50 mg tablet Take 50 mg by mouth every 12 (twelve) hours      omeprazole (PriLOSEC) 40 MG capsule Take 40 mg by mouth daily in the early morning      PARoxetine (PAXIL) 30 mg tablet Take 30 mg by mouth every morning      traMADol (ULTRAM) 50 mg tablet Take 50 mg by mouth every 6 (six) hours as needed for moderate pain           No discharge procedures on file      ED Provider  Electronically Signed by           Zach Valenzuela MD  07/02/19 0742

## 2019-07-01 NOTE — ANESTHESIA PREPROCEDURE EVALUATION
Review of Systems/Medical History  Patient summary reviewed  Chart reviewed      Cardiovascular  Hyperlipidemia, Hypertension ,    Pulmonary  Smoker ex-smoker  , No asthma ,        GI/Hepatic    GERD well controlled,          Comment: Pyelonephritis  Hydronephrosis  hydroureter     Endo/Other  No diabetes ,   Obesity    GYN       Hematology      Comment: sepsis Musculoskeletal  Negative musculoskeletal ROS        Neurology  Negative neurology ROS      Psychology   Anxiety,              Physical Exam    Airway    Mallampati score: II  TM Distance: <3 FB  Neck ROM: full     Dental       Cardiovascular  Rhythm: regular, Rate: normal, Cardiovascular exam normal    Pulmonary  Pulmonary exam normal     Other Findings        Anesthesia Plan  ASA Score- 3 Emergent    Anesthesia Type- general with ASA Monitors  Additional Monitors:   Airway Plan: LMA  Plan Factors-Patient not instructed to abstain from smoking on day of procedure  Patient did not smoke on day of surgery  Induction- intravenous  Postoperative Plan-     Informed Consent- Anesthetic plan and risks discussed with patient

## 2019-07-01 NOTE — ED RE-EVALUATION NOTE
Patient signed out to me earlier  Asked to follow up on labs, CT scan an EKG  EKG shows sinus tachycardia  Blood work shows in a [address] with leukocytosis  CT scan shows pyelonephritis with hydroureter and hydronephrosis  Urinalysis does show positive nitrites  Will admit for IV antibiotics and further evaluation and treatment       Geno Kathleen DO  07/01/19 5497

## 2019-07-01 NOTE — UTILIZATION REVIEW
Initial Clinical Review    Admission: Date/Time/Statement: 7/1/19 @ 0258     Orders Placed This Encounter   Procedures    Inpatient Admission (expected length of stay for this patient Order details is greater than two midnights)     Standing Status:   Standing     Number of Occurrences:   1     Order Specific Question:   Admitting Physician     Answer:   Reji Miranda     Order Specific Question:   Level of Care     Answer:   Med Surg [16]     Order Specific Question:   Estimated length of stay     Answer:   More than 2 Midnights     Order Specific Question:   Certification     Answer:   I certify that inpatient services are medically necessary for this patient for a duration of greater than two midnights  See H&P and MD Progress Notes for additional information about the patient's course of treatment  ED Arrival Information     Expected Arrival Acuity Means of Arrival Escorted By Service Admission Type    - 6/30/2019 22:14 Urgent Wheelchair Self Hospitalist Urgent    Arrival Complaint    -        Chief Complaint   Patient presents with    Abdominal Pain     Pt c/o generalized abdominal pain and one episode vomiting; also c/o diarrhea 3-4 days ago; Pt also c/o pain and burning with urination and increase frequency with urination that began 3 days ago    Vomiting    Possible UTI     Assessment/Plan:    71  Y O female  Presents to ED  From home with abdominal pain for past  2 days, worsening and radiating to back  Additionally, has  Urinary frequency, dysuria  And  Urgency  Abd  Pain is constant and sharp and worse with urination  Given  IV  MSo4  In ED with some relief  Resides in [place],  No  Physician  Locally  Found with abnl labs and  Concern for sepsis  Admit  IP with Sepsis, likely  Source  Pyelonephritis and plan  BC,  IVF, urology  Consult, pain control  And DOMENIC        PE:      LCVAT    S/P  tenderness    Per  Urology  Consult:    Plan  OR   7/1  For  Cysto,  L retrograde  Pyelogram  And L stent insertion  ED Triage Vitals   Temperature Pulse Respirations Blood Pressure SpO2   06/30/19 2221 06/30/19 2221 06/30/19 2221 06/30/19 2221 06/30/19 2221   98 °F (36 7 °C) (!) 132 20 146/72 95 %      Temp Source Heart Rate Source Patient Position - Orthostatic VS BP Location FiO2 (%)   06/30/19 2221 06/30/19 2221 07/01/19 0033 07/01/19 0033 --   Oral Monitor Lying Right arm       Pain Score       06/30/19 2221       5        Wt Readings from Last 1 Encounters:   07/01/19 91 kg (200 lb 9 9 oz)     Additional Vital Signs:   07/01/19 0718  100 3 °F (37 9 °C)  103  18  171/80Abnormal   95 %  None (Room air)  Sitting   07/01/19 0331  98 6 °F (37 °C)  112Abnormal   20  159/73  96 %  None (Room air)  Lying   07/01/19 0246    112Abnormal   25Abnormal   134/61  94 %  None (Room air)  Lying   07/01/19 0033    111Abnormal   16  140/64  96 %  None (Room air)  Lying   06/30/19 2221  98 °F (36 7 °C)  132Abnormal   20  146/72  95 %  None (Room            Pertinent Labs/Diagnostic Test Results:     Ct  Abd/pelvis  (  7/1)     Moderate left hydronephrosis and hydroureter with perinephric and periureteral stranding which may be seen in the setting of pyelonephritis   Correlate with urinalysis and costovertebral angle tenderness    Results from last 7 days   Lab Units 06/30/19  2316   WBC Thousand/uL 17 47*   HEMOGLOBIN g/dL 12 0   HEMATOCRIT % 37 6   PLATELETS Thousands/uL 299   NEUTROS ABS Thousands/µL 15 23*         Results from last 7 days   Lab Units 06/30/19  2358   SODIUM mmol/L 135*   POTASSIUM mmol/L 3 6   CHLORIDE mmol/L 99*   CO2 mmol/L 21   ANION GAP mmol/L 15*   BUN mg/dL 16   CREATININE mg/dL 1 42*   EGFR ml/min/1 73sq m 43   CALCIUM mg/dL 9 5     Results from last 7 days   Lab Units 06/30/19  2358   AST U/L 13   ALT U/L 19   ALK PHOS U/L 137*   TOTAL PROTEIN g/dL 7 4   ALBUMIN g/dL 2 6*   TOTAL BILIRUBIN mg/dL 1 00         Results from last 7 days   Lab Units 06/30/19  2358   GLUCOSE RANDOM mg/dL 182*               Results from last 7 days   Lab Units 06/30/19  2316   LACTIC ACID mmol/L 1 3                     Results from last 7 days   Lab Units 06/30/19  2358   LIPASE u/L 67*             Results from last 7 days   Lab Units 07/01/19  0134 07/01/19  0124   CLARITY UA  Clear cloudy   COLOR UA  Yellow yellow   SPEC GRAV UA  1 020  --    PH UA  5 5  --    GLUCOSE UA mg/dl Negative  --    KETONES UA mg/dl 40 (2+)*  --    BLOOD UA  Moderate*  --    PROTEIN UA mg/dl 100 (2+)*  --    NITRITE UA  Positive*  --    BILIRUBIN UA  Negative  --    UROBILINOGEN UA E U /dl 1 0  --    LEUKOCYTES UA  Large*  --    WBC UA /hpf Innumerable*  --    RBC UA /hpf 4-10*  --    BACTERIA UA /hpf Innumerable*  --    EPITHELIAL CELLS WET PREP /hpf Occasional  --          ED Treatment:   Medication Administration from 06/30/2019 2214 to 07/01/2019 0324       Date/Time Order Dose Route Comments     07/01/2019 0034 sodium chloride 0 9 % bolus 1,000 mL 0 mL Intravenous      06/30/2019 2318 sodium chloride 0 9 % bolus 1,000 mL 1,000 mL Intravenous      06/30/2019 2319 morphine (PF) 4 mg/mL injection 4 mg 4 mg Intravenous      06/30/2019 2319 ondansetron (ZOFRAN) injection 4 mg 4 mg Intravenous      07/01/2019 0318 ceFAZolin (ANCEF) IVPB (premix) 2,000 mg 0 mg Intravenous      07/01/2019 0300 ceFAZolin (ANCEF) IVPB (premix) 2,000 mg 2,000 mg Intravenous      07/01/2019 0255 sodium chloride 0 9 % bolus 1,000 mL 1,000 mL Intravenous           Present on Admission:  **None**      Admitting Diagnosis: Hydronephrosis [N13 30]  Hydroureter [N13 4]  Vomiting [R11 10]  Abdominal pain [R10 9]  Pyelonephritis [N12]  [facility] [N17 9]  Age/Sex: 71 y o  female  Admission Orders:    Current Facility-Administered Medications:  acetaminophen 650 mg Oral Q6H PRN   aluminum-magnesium hydroxide-simethicone 30 mL Oral Q6H PRN   atorvastatin 80 mg Oral Daily With Dinner   cefazolin 2,000 mg Intravenous Q8H   cilostazol 100 mg Oral BID enoxaparin 40 mg Subcutaneous Daily   isosorbide dinitrate 30 mg Oral 4x Daily   metoprolol tartrate 50 mg Oral Q12H ANNABEL   morphine injection 2 mg Intravenous Q4H PRN   ondansetron 4 mg Intravenous Q6H PRN   oxyCODONE 5 mg Oral Q4H PRN   pantoprazole 40 mg Oral Early Morning   PARoxetine 30 mg Oral QAM   polyethylene glycol 17 g Oral Daily PRN   sodium chloride 100 mL/hr Intravenous Continuous   traMADol 50 mg Oral Q6H PRN       IP CONSULT TO UROLOGY    Network Utilization Review Department  Phone: [phone]; Fax [phone]  [email or web address]  ATTENTION: Please call with any questions or concerns to [phone]  and carefully listen to the prompts so that you are directed to the right person  Send all requests for admission clinical reviews, approved or denied determinations and any other requests to fax [phone]   All voicemails are confidential

## 2019-07-01 NOTE — OP NOTE
OPERATIVE REPORT  PATIENT NAME: Tay Baptiste    :  1950  MRN: [id number]  Pt Location: [facility] OR ROOM 06    SURGERY DATE: 2019    Surgeon(s) and Role:     * Queta Gee MD - Primary    Preop Diagnosis:  Hydronephrosis [N13 30]  Hydroureter [N13 4]    Post-Op Diagnosis Codes: * Hydronephrosis [N13 30]     * Hydroureter [N13 4]    Procedure(s) (LRB):  CYSTOSCOPY RETROGRADE PYELOGRAM WITH INSERTION STENT URETERAL (Left)    Specimen(s):  ID Type Source Tests Collected by Time Destination   A :  Urine Urine, Cystoscopic URINE CULTURE Queta Gee MD 2019 1653        Estimated Blood Loss:   Minimal    Drains:  Urethral Catheter Latex 16 Fr  (Active)   Number of days: 0       Ureteral Drain/Stent Left ureter 6 Fr  (Active)   Number of days: 0       Anesthesia Type:   General    Operative Indications:  Hydronephrosis [N13 30]  Hydroureter [N13 4]      Operative Findings:  Proximal and distal ureteral obstructions  Significant purulence drained from left pelvicaliceal system    Complications:   None    Procedure and Technique:  70-year-old woman who presented with sepsis, and found to have left proximal and distal ureteral obstructions  Risk and benefits of cystoscopy with left ureteral stent insertion were discussed and reviewed  The patient understands that I will not remove the cause of her obstruction at this time and that she will require interval evaluation and management in the future  Informed consent was obtained  After the smooth induction of general LMA anesthesia, the patient was placed in the dorsal lithotomy position  Her genitalia was prepped and draped in a sterile fashion  Venodyne boots had been applied  Intravenous antibiotics were administered  A timeout was performed with all members of the operative team confirming the patient's identity, procedure to be performed, and laterality of the case  A 23 Serbian rigid cystoscope with 30° lens was inserted    The bladder was thoroughly inspected  Attention was focused on the left ureteral orifice  A 5 Hungarian open-ended catheter was inserted  A left retrograde pyelogram was performed  A distal ureteral stenosis ureter was identified consistent with a stricture, and a proximal possible UPJ stenosis was noted consistent with stricture     A wire was placed proximal to both obstructions and cloudy-appearing urine came from the ureteral orifice  There were no filling defects identified  A wire was reinserted and placed into the upper pole calyx  A multi-length 6 Hungarian double-J ureteral stent was then placed in the standard fashion  The proximal coil was appreciated in the left renal pelvis and the distal coil was visualized within the bladder  There was a short string left in place  The bladder was left full and the cystoscope was removed  A 16 Hungarian Payton catheter was then inserted to allow maximal drainage of the purulence  Overall the patient tolerated the procedure well and there were no complications  The patient was extubated in the operating room and transferred to the PACU in stable condition at the conclusion of the case      Patient Disposition:  PACU     SIGNATURE: Giana Ely MD  DATE: July 1, 2019  TIME: 5:12 PM

## 2019-07-01 NOTE — ASSESSMENT & PLAN NOTE
Glucose elevated to 182 at time of admission  Patient reports she was diagnosed with "borderline diabetes"   Does not take medications at home for diabetes  Will check A1C and place on diabetic diet

## 2019-07-01 NOTE — ASSESSMENT & PLAN NOTE
2 Day Post-Op  Procedure(s):  CYSTOSCOPY RETROGRADE PYELOGRAM WITH INSERTION STENT URETERAL  Surgeon(s):  Edil Díaz MD  7/1/2019    One of two blood cultures without growth of E coli, urine culture E coli  Susceptible to cefazolin, continue  Now afebrile greater than 24 hours, last temp of 101° was 7:00 a m   Yesterday  Remove Payton catheter now afebrile  Urology follow-up in [place] for stent management and evaluation of left ureteral stricture x2

## 2019-07-01 NOTE — CONSULTS
Consult - Urology   Karissa Hair 1950, 71 y o  female MRN: [id number]    Unit/Bed#: E2 -01 Encounter: [id number]    Pyelonephritis  Assessment & Plan  3-4 days preceding lower urinary tract symptoms, not within past 24 hours development of abdominal pain and bilateral flank pain, left greater than right  CT scan with evidence of left hydronephrosis and hydroureter without calculi  Cr 1 4, unknown baseline no prior records  Continue IV antibiotic coverage with Ancef, tailor antibiotics to cultures which have been sent, still pending    NPO starting 8:33 AM  To OR this afternoon for cystoscopy, left retrograde pyelogram, left stent insertion    * Sepsis ([facility] )  Assessment & Plan  With tachycardia, tachypnea, leukocytosis of 17  Source- urinary tract infection, pyelonephritis; blood and urine cultures pending  Continue IV Ancef, IV fluids, supportive care including antipyretics, antiemetics, analgesia  Continue to trend leukocytosis, creatinine, fever curve  Noted low-grade temp 100 3° this morning, patient with chills  Cr 1 4 no prior records, could be patient baseline however may represent GENO from sepsis or obstruction      Bedside rounds performed with Brittany Jarrett RN  Discussed with Dr Russ Bumpers:   51-year-old female presents to the hospital with lower abdominal pain, bilateral flank pain, nausea and chills, with associated lower urinary tract symptoms primarily incontinence, dysuria and urgency which is acute over the last 3-4 days  In the emergency department she is noted to be tachycardic, tachypneic, with leukocytosis of 17  CT scan reveals evidence of pyelonephritis with left hydronephrosis hydroureter  Patient denies prior urologic history  She reports she may have had one or two simple urinary tract infections in the past, most recently about a year to ago  She has never had any kidney stones, or urologic surgeries  She has not been hospitalized at all within the past year  She is actually just here visiting from [facility] for the past two weeks, with her sister who lives in [facility] and was supposed to go back home later this week  Review of Systems   Constitutional: Positive for appetite change, chills and fever  Negative for activity change, fatigue and unexpected weight change  HENT: Negative  Respiratory: Positive for shortness of breath  Cardiovascular: Negative  Negative for chest pain  Gastrointestinal: Positive for abdominal pain, nausea and vomiting  Negative for diarrhea  Endocrine: Negative  Genitourinary: Positive for dysuria, flank pain, frequency and urgency  Negative for decreased urine volume, difficulty urinating and hematuria  Musculoskeletal: Negative for back pain and gait problem  Skin: Negative  Allergic/Immunologic: Negative  Neurological: Negative  Hematological: Negative for adenopathy  Does not bruise/bleed easily  Objective:  Vitals: Blood pressure (!) 171/80, pulse 103, temperature 100 3 °F (37 9 °C), temperature source Tympanic, resp  rate 18, height 5' 11" (1 803 m), weight 91 kg (200 lb 9 9 oz), SpO2 95 %  ,Body mass index is 27 98 kg/m²  Intake/Output Summary (Last 24 hours) at 7/1/2019 0844  Last data filed at 7/1/2019 0318  Gross per 24 hour   Intake 1100 ml   Output    Net 1100 ml       Invasive Devices     Peripheral Intravenous Line            Peripheral IV 06/30/19 Left Hand less than 1 day                Physical Exam   Constitutional: She is oriented to person, place, and time  She appears well-developed and well-nourished  No distress  Appears fatigued   HENT:   Head: Normocephalic and atraumatic  Cardiovascular: Normal rate, regular rhythm and intact distal pulses  Pulmonary/Chest: Effort normal and breath sounds normal    Abdominal: Soft  Bowel sounds are normal  She exhibits no distension  There is tenderness (Suprapubic tenderness, positive left CVA tenderness)   There is no rebound and no guarding  Musculoskeletal: She exhibits no edema  Neurological: She is alert and oriented to person, place, and time  Gait normal    Skin: Skin is warm  Capillary refill takes less than 2 seconds  She is not diaphoretic  Psychiatric: She has a normal mood and affect  Her speech is normal and behavior is normal    Nursing note and vitals reviewed  History:    Past Medical History:   Diagnosis Date    Asthma     Diabetes mellitus ([address] )     Hyperlipidemia     Hypertension      History reviewed  No pertinent surgical history  History reviewed  No pertinent family history    Social History     Socioeconomic History    Marital status: Unknown     Spouse name: None    Number of children: None    Years of education: None    Highest education level: None   Occupational History    None   Social Needs    Financial resource strain: None    Food insecurity:     Worry: None     Inability: None    Transportation needs:     Medical: None     Non-medical: None   Tobacco Use    Smoking status: Former Smoker    Smokeless tobacco: Never Used   Substance and Sexual Activity    Alcohol use: Not Currently    Drug use: Never    Sexual activity: None   Lifestyle    Physical activity:     Days per week: None     Minutes per session: None    Stress: None   Relationships    Social connections:     Talks on phone: None     Gets together: None     Attends Jewish service: None     Active member of club or organization: None     Attends meetings of clubs or organizations: None     Relationship status: None    Intimate partner violence:     Fear of current or ex partner: None     Emotionally abused: None     Physically abused: None     Forced sexual activity: None   Other Topics Concern    None   Social History Narrative    None       Imaging:    CT abdomen pelvis wo contrast [[id number]] Collected: 07/01/19 2702   Order Status: Completed Updated: 07/01/19 0220   Narrative:     CT ABDOMEN AND PELVIS WITHOUT IV CONTRAST    INDICATION:   acute abdominal pain  COMPARISON:  None  TECHNIQUE:  CT examination of the abdomen and pelvis was performed without intravenous contrast   Axial, sagittal, and coronal 2D reformatted images were created from the source data and submitted for interpretation  Radiation dose length product (DLP) for this visit: [id number] mGy-cm    This examination, like all CT scans performed in the [facility], was performed utilizing techniques to minimize radiation dose exposure, including the use of iterative   reconstruction and automated exposure control  Enteric contrast was administered  FINDINGS:    ABDOMEN    LOWER CHEST:  No clinically significant abnormality identified in the visualized lower chest     LIVER/BILIARY TREE:  Unremarkable  GALLBLADDER: Jackalyn Pass is surgically absent  SPLEEN:  Unremarkable  PANCREAS:  Unremarkable  ADRENAL GLANDS:  Unremarkable  KIDNEYS/URETERS: Myrl Dural left hydronephrosis and hydroureter with perinephric and periureteral stranding   No significant calculi are seen   Extrarenal pelvis on the right  STOMACH AND BOWEL:  Small hiatal hernia   No bowel obstruction   Hypermobile cecum within the left lower quadrant  APPENDIX:  A normal appendix was visualized  ABDOMINOPELVIC CAVITY:  No ascites or free intraperitoneal air  No lymphadenopathy  VESSELS: Carson Locker is an aortobiiliac graft  PELVIS    REPRODUCTIVE ORGANS:  Unremarkable for patient's age  URINARY BLADDER:  Unremarkable  ABDOMINAL WALL/INGUINAL REGIONS:  Intra-abdominal wall postsurgical changes  OSSEOUS STRUCTURES:  No acute fracture or destructive osseous lesion   Degenerative changes of the osseous structures  Impression:       Moderate left hydronephrosis and hydroureter with perinephric and periureteral stranding which may be seen in the setting of pyelonephritis   Correlate with urinalysis and costovertebral angle tenderness  Imaging reviewed - both report and images personally reviewed  Labs:  Recent Labs     06/30/19  2316   WBC 17 47*     Recent Labs     06/30/19  2316   HGB 12 0       Recent Labs     06/30/19  2358   CREATININE 1 42*       Microbiology:  Urinalysis 4-10 RBCs, innumerable WBCs, innumerable bacteria, occasional epithelial cells      Urine culture sent, pending  Blood culture x2 sent, pending    Obie Parks PA-C  Date: 7/1/2019 Time: 8:44 AM

## 2019-07-01 NOTE — ASSESSMENT & PLAN NOTE
Creatinine elevated to 1 42 at time of admission  Unknown baseline   Consider secondary to dehydration vs  Kidney infection   Will continue to trend BMP with IV fluid hydration and IV antibiotics

## 2019-07-01 NOTE — PROGRESS NOTES
Notified by nurse-patient growing gram negative rods in blood culture from   Second blood culture still pending  Currently maintained on IV cefazolin for acute UTI/pyelonephritis  Stop cefazolin and broaden antibiotic coverage  Current Vitals:  Temperature 100 4, pulse 92, resp 16, /61, O2 sat 95% on NC    Patient presented with sepsis with pyelonephritis/urinary source  Recent instrumentation with cystoscopy and left stent insertion today 19  Also no records indicating medical history as pt lives in [place]  Therefore will provide with IV Zosyn for pseudomonal coverage  And wait final cultures to tailor antibiotics  Normal saline running at 100 mL/hour  Order will  in 3 hours  Will renew for an additional day  CBC and BMP labs ordered for tomorrow

## 2019-07-01 NOTE — ASSESSMENT & PLAN NOTE
Patient reports history of HTN  BP appears stable at this time  Will hold home lisinopril for GENO  Continue home Lopressor and continue to monitor

## 2019-07-01 NOTE — H&P (VIEW-ONLY)
Consult - Urology   Barry Woodruff 1950, 71 y o  female MRN: [id number]    Unit/Bed#: E2 -01 Encounter: [id number]    Pyelonephritis  Assessment & Plan  3-4 days preceding lower urinary tract symptoms, not within past 24 hours development of abdominal pain and bilateral flank pain, left greater than right  CT scan with evidence of left hydronephrosis and hydroureter without calculi  Cr 1 4, unknown baseline no prior records  Continue IV antibiotic coverage with Ancef, tailor antibiotics to cultures which have been sent, still pending    NPO starting 8:33 AM  To OR this afternoon for cystoscopy, left retrograde pyelogram, left stent insertion    * Sepsis ([facility] [address] )  Assessment & Plan  With tachycardia, tachypnea, leukocytosis of 17  Source- urinary tract infection, pyelonephritis; blood and urine cultures pending  Continue IV Ancef, IV fluids, supportive care including antipyretics, antiemetics, analgesia  Continue to trend leukocytosis, creatinine, fever curve  Noted low-grade temp 100 3° this morning, patient with chills  Cr 1 4 no prior records, could be patient baseline however may represent GENO from sepsis or obstruction      Bedside rounds performed with Shaniqua Qureshi RN  Discussed with Dr Geoff Christian:   77-year-old female presents to the hospital with lower abdominal pain, bilateral flank pain, nausea and chills, with associated lower urinary tract symptoms primarily incontinence, dysuria and urgency which is acute over the last 3-4 days  In the emergency department she is noted to be tachycardic, tachypneic, with leukocytosis of 17  CT scan reveals evidence of pyelonephritis with left hydronephrosis hydroureter  Patient denies prior urologic history  She reports she may have had one or two simple urinary tract infections in the past, most recently about a year to ago  She has never had any kidney stones, or urologic surgeries  She has not been hospitalized at all within the past year  She is actually just here visiting from [facility] for the past two weeks, with her sister who lives in [place] [facility] and was supposed to go back home later this week  Review of Systems   Constitutional: Positive for appetite change, chills and fever  Negative for activity change, fatigue and unexpected weight change  HENT: Negative  Respiratory: Positive for shortness of breath  Cardiovascular: Negative  Negative for chest pain  Gastrointestinal: Positive for abdominal pain, nausea and vomiting  Negative for diarrhea  Endocrine: Negative  Genitourinary: Positive for dysuria, flank pain, frequency and urgency  Negative for decreased urine volume, difficulty urinating and hematuria  Musculoskeletal: Negative for back pain and gait problem  Skin: Negative  Allergic/Immunologic: Negative  Neurological: Negative  Hematological: Negative for adenopathy  Does not bruise/bleed easily  Objective:  Vitals: Blood pressure (!) 171/80, pulse 103, temperature 100 3 °F (37 9 °C), temperature source Tympanic, resp  rate 18, height 5' 11" (1 803 m), weight 91 kg (200 lb 9 9 oz), SpO2 95 %  ,Body mass index is 27 98 kg/m²  Intake/Output Summary (Last 24 hours) at 7/1/2019 0844  Last data filed at 7/1/2019 0318  Gross per 24 hour   Intake 1100 ml   Output    Net 1100 ml       Invasive Devices     Peripheral Intravenous Line            Peripheral IV 06/30/19 Left Hand less than 1 day                Physical Exam   Constitutional: She is oriented to person, place, and time  She appears well-developed and well-nourished  No distress  Appears fatigued   HENT:   Head: Normocephalic and atraumatic  Cardiovascular: Normal rate, regular rhythm and intact distal pulses  Pulmonary/Chest: Effort normal and breath sounds normal    Abdominal: Soft  Bowel sounds are normal  She exhibits no distension  There is tenderness (Suprapubic tenderness, positive left CVA tenderness)   There is no rebound and no guarding  Musculoskeletal: She exhibits no edema  Neurological: She is alert and oriented to person, place, and time  Gait normal    Skin: Skin is warm  Capillary refill takes less than 2 seconds  She is not diaphoretic  Psychiatric: She has a normal mood and affect  Her speech is normal and behavior is normal    Nursing note and vitals reviewed  History:    Past Medical History:   Diagnosis Date    Asthma     Diabetes mellitus ([address] )     Hyperlipidemia     Hypertension      History reviewed  No pertinent surgical history  History reviewed  No pertinent family history    Social History     Socioeconomic History    Marital status: Unknown     Spouse name: None    Number of children: None    Years of education: None    Highest education level: None   Occupational History    None   Social Needs    Financial resource strain: None    Food insecurity:     Worry: None     Inability: None    Transportation needs:     Medical: None     Non-medical: None   Tobacco Use    Smoking status: Former Smoker    Smokeless tobacco: Never Used   Substance and Sexual Activity    Alcohol use: Not Currently    Drug use: Never    Sexual activity: None   Lifestyle    Physical activity:     Days per week: None     Minutes per session: None    Stress: None   Relationships    Social connections:     Talks on phone: None     Gets together: None     Attends Muslim service: None     Active member of club or organization: None     Attends meetings of clubs or organizations: None     Relationship status: None    Intimate partner violence:     Fear of current or ex partner: None     Emotionally abused: None     Physically abused: None     Forced sexual activity: None   Other Topics Concern    None   Social History Narrative    None       Imaging:    CT abdomen pelvis wo contrast [[id number]] Collected: 07/01/19 1110   Order Status: Completed Updated: 07/01/19 0220   Narrative:     CT ABDOMEN AND PELVIS WITHOUT IV CONTRAST    INDICATION:   acute abdominal pain  COMPARISON:  None  TECHNIQUE:  CT examination of the abdomen and pelvis was performed without intravenous contrast   Axial, sagittal, and coronal 2D reformatted images were created from the source data and submitted for interpretation  Radiation dose length product (DLP) for this visit: [id number] mGy-cm    This examination, like all CT scans performed in the [facility], was performed utilizing techniques to minimize radiation dose exposure, including the use of iterative   reconstruction and automated exposure control  Enteric contrast was administered  FINDINGS:    ABDOMEN    LOWER CHEST:  No clinically significant abnormality identified in the visualized lower chest     LIVER/BILIARY TREE:  Unremarkable  GALLBLADDER: Sandi [place] is surgically absent  SPLEEN:  Unremarkable  PANCREAS:  Unremarkable  ADRENAL GLANDS:  Unremarkable  KIDNEYS/URETERS: Rafiq Brown left hydronephrosis and hydroureter with perinephric and periureteral stranding   No significant calculi are seen   Extrarenal pelvis on the right  STOMACH AND BOWEL:  Small hiatal hernia   No bowel obstruction   Hypermobile cecum within the left lower quadrant  APPENDIX:  A normal appendix was visualized  ABDOMINOPELVIC CAVITY:  No ascites or free intraperitoneal air  No lymphadenopathy  VESSELS: Gumaro Upton is an aortobiiliac graft  PELVIS    REPRODUCTIVE ORGANS:  Unremarkable for patient's age  URINARY BLADDER:  Unremarkable  ABDOMINAL WALL/INGUINAL REGIONS:  Intra-abdominal wall postsurgical changes  OSSEOUS STRUCTURES:  No acute fracture or destructive osseous lesion   Degenerative changes of the osseous structures  Impression:       Moderate left hydronephrosis and hydroureter with perinephric and periureteral stranding which may be seen in the setting of pyelonephritis   Correlate with urinalysis and costovertebral angle tenderness  Imaging reviewed - both report and images personally reviewed  Labs:  Recent Labs     06/30/19  2316   WBC 17 47*     Recent Labs     06/30/19  2316   HGB 12 0       Recent Labs     06/30/19  2358   CREATININE 1 42*       Microbiology:  Urinalysis 4-10 RBCs, innumerable WBCs, innumerable bacteria, occasional epithelial cells      Urine culture sent, pending  Blood culture x2 sent, pending    Goldie Lovett PA-C  Date: 7/1/2019 Time: 8:44 AM

## 2019-07-01 NOTE — UTILIZATION REVIEW
Notification of Inpatient Admission/Inpatient Authorization Request  This is a Notification of Inpatient Admission/Request for Inpatient Authorization for our facility [address]  Be advised that this patient was admitted to our facility under Inpatient Status  Please contact the Utilization Review Department where the patient is receiving care services for additional admission information  Place of Service Code: 24   Place of Service Name: Inpatient Hospital  Presentation Date & Time: 6/30/2019 10:23 PM  Inpatient Admission Date & Time: 7/1/19 0258  Discharge Date & Time: No discharge date for patient encounter  Discharge Disposition (if discharged): Final discharge disposition not confirmed  Attending Physician: Lynette Robison [[id number]]  Admission Orders (From admission, onward)    Ordered        07/01/19 0258  Inpatient Admission (expected length of stay for this patient Order details is greater than two midnights)  Once             Facility: [address] [place] [address] Utilization Review Department  Phone: [phone]; Fax [phone]  [email or web address]  ATTENTION: Please call with any questions or concerns to [phone]  and carefully listen to the prompts so that you are directed to the right person  Send all requests for admission clinical reviews, approved or denied determinations and any other requests to fax [phone]   All voicemails are confidential

## 2019-07-01 NOTE — ANESTHESIA POSTPROCEDURE EVALUATION
Post-Op Assessment Note    CV Status:  Stable    Pain management: adequate     Mental Status:  Alert and awake   Hydration Status:  Euvolemic   PONV Controlled:  Controlled   Airway Patency:  Patent   Post Op Vitals Reviewed: Yes      Staff: Anesthesiologist           /61 (07/01/19 1744)    Temp     Pulse 98 (07/01/19 1744)   Resp 20 (07/01/19 1744)    SpO2 95 % (07/01/19 1744)

## 2019-07-01 NOTE — PLAN OF CARE
Problem: Potential for Falls  Goal: Patient will remain free of falls  Description  INTERVENTIONS:  - Assess patient frequently for physical needs  -  Identify cognitive and physical deficits and behaviors that affect risk of falls  -  [place] fall precautions as indicated by assessment   - Educate patient/family on patient safety including physical limitations  - Instruct patient to call for assistance with activity based on assessment  - Modify environment to reduce risk of injury  - Consider OT/PT consult to assist with strengthening/mobility  Outcome: Progressing     Problem: Nutrition/Hydration-ADULT  Goal: Nutrient/Hydration intake appropriate for improving, restoring or maintaining nutritional needs  Description  Monitor and assess patient's nutrition/hydration status for malnutrition (ex- brittle hair, bruises, dry skin, pale skin and conjunctiva, muscle wasting, smooth red tongue, and disorientation)  Collaborate with interdisciplinary team and initiate plan and interventions as ordered  Monitor patient's weight and dietary intake as ordered or per policy  Utilize nutrition screening tool and intervene per policy  Determine patient's food preferences and provide high-protein, high-caloric foods as appropriate       INTERVENTIONS:  - Monitor oral intake, urinary output, labs, and treatment plans  - Assess nutrition and hydration status and recommend course of action  - Evaluate amount of meals eaten  - Assist patient with eating if necessary   - Allow adequate time for meals  - Recommend/ encourage appropriate diets, oral nutritional supplements, and vitamin/mineral supplements  - Order, calculate, and assess calorie counts as needed  - Recommend, monitor, and adjust tube feedings and TPN/PPN based on assessed needs  - Assess need for intravenous fluids  - Provide specific nutrition/hydration education as appropriate  - Include patient/family/caregiver in decisions related to nutrition  Outcome: Progressing

## 2019-07-01 NOTE — ASSESSMENT & PLAN NOTE
With tachycardia, tachypnea, leukocytosis of 17, WBC down to 9 6 to today  Source- urinary tract infection, pyelonephritis; 1 of 2 blood culures and urine culture positive for E  Coli   Left ureter with proximal and distal stricture, status post stenting, with purulenct urine from collecting system    Current coverage with cefazolin, anticipate transition to cephalexin 14 days at discharge  Remove Payton catheter today, as patient afebrile now greater than 24 hours  Patient is from [place], and is planning to return to [place] tomorrow 7/4/2019  I discussed with her the need for outpatient follow-up with Urology at home for ureteroscopy with possible dilation as well as stent management

## 2019-07-01 NOTE — ASSESSMENT & PLAN NOTE
Patient presents with tachycardia, tachypnea, leukocytosis   Source likely pyelonephritis   CT abdomen/pelvis showing moderate left hydronephrosis and hydroureter with perinephric and periuretheral stranding  Associated nausea, vomiting, abdominal pain and diarrhea, c/o frequency, urgency, dysuria   Given dose of IV Ancef in ED, will continue Q8hrs and consult urology for further recommendations   PRN Zofran, tylenol, Ultram and morphine   Blood cultures pending, lactic negative   IV fluid hydration

## 2019-07-02 LAB
ANION GAP SERPL CALCULATED.3IONS-SCNC: 12 MMOL/L (ref 4–13)
BUN SERPL-MCNC: 17 MG/DL (ref 5–25)
CALCIUM SERPL-MCNC: 8.2 MG/DL (ref 8.3–10.1)
CHLORIDE SERPL-SCNC: 103 MMOL/L (ref 100–108)
CO2 SERPL-SCNC: 23 MMOL/L (ref 21–32)
CREAT SERPL-MCNC: 1.52 MG/DL (ref 0.6–1.3)
ERYTHROCYTE [DISTWIDTH] IN BLOOD BY AUTOMATED COUNT: 13.9 % (ref 11.6–15.1)
EST. AVERAGE GLUCOSE BLD GHB EST-MCNC: 128 MG/DL
GFR SERPL CREATININE-BSD FRML MDRD: 40 ML/MIN/1.73SQ M
GLUCOSE SERPL-MCNC: 97 MG/DL (ref 65–140)
HBA1C MFR BLD: 6.1 % (ref 4.2–6.3)
HCT VFR BLD AUTO: 29.7 % (ref 34.8–46.1)
HGB BLD-MCNC: 9.2 G/DL (ref 11.5–15.4)
MCH RBC QN AUTO: 31 PG (ref 26.8–34.3)
MCHC RBC AUTO-ENTMCNC: 31 G/DL (ref 31.4–37.4)
MCV RBC AUTO: 100 FL (ref 82–98)
PLATELET # BLD AUTO: 253 THOUSANDS/UL (ref 149–390)
PMV BLD AUTO: 10.8 FL (ref 8.9–12.7)
POTASSIUM SERPL-SCNC: 3.4 MMOL/L (ref 3.5–5.3)
RBC # BLD AUTO: 2.97 MILLION/UL (ref 3.81–5.12)
SODIUM SERPL-SCNC: 138 MMOL/L (ref 136–145)
WBC # BLD AUTO: 11.92 THOUSAND/UL (ref 4.31–10.16)

## 2019-07-02 PROCEDURE — 99233 SBSQ HOSP IP/OBS HIGH 50: CPT | Performed by: INTERNAL MEDICINE

## 2019-07-02 PROCEDURE — 99232 SBSQ HOSP IP/OBS MODERATE 35: CPT | Performed by: PHYSICIAN ASSISTANT

## 2019-07-02 PROCEDURE — 87493 C DIFF AMPLIFIED PROBE: CPT | Performed by: INTERNAL MEDICINE

## 2019-07-02 PROCEDURE — 80048 BASIC METABOLIC PNL TOTAL CA: CPT | Performed by: UROLOGY

## 2019-07-02 PROCEDURE — 85027 COMPLETE CBC AUTOMATED: CPT | Performed by: UROLOGY

## 2019-07-02 PROCEDURE — 83036 HEMOGLOBIN GLYCOSYLATED A1C: CPT | Performed by: UROLOGY

## 2019-07-02 RX ORDER — ISOSORBIDE DINITRATE 20 MG/1
20 TABLET ORAL 4 TIMES DAILY
Status: DISCONTINUED | OUTPATIENT
Start: 2019-07-02 | End: 2019-07-03 | Stop reason: HOSPADM

## 2019-07-02 RX ADMIN — PIPERACILLIN SODIUM AND TAZOBACTAM SODIUM 3.38 G: 36; 4.5 INJECTION, POWDER, FOR SOLUTION INTRAVENOUS at 05:44

## 2019-07-02 RX ADMIN — ISOSORBIDE DINITRATE 30 MG: 20 TABLET ORAL at 08:26

## 2019-07-02 RX ADMIN — ACETAMINOPHEN 650 MG: 325 TABLET ORAL at 21:26

## 2019-07-02 RX ADMIN — PANTOPRAZOLE SODIUM 40 MG: 40 TABLET, DELAYED RELEASE ORAL at 05:45

## 2019-07-02 RX ADMIN — ATORVASTATIN CALCIUM 80 MG: 40 TABLET, FILM COATED ORAL at 16:57

## 2019-07-02 RX ADMIN — CILOSTAZOL 100 MG: 50 TABLET ORAL at 08:26

## 2019-07-02 RX ADMIN — ENOXAPARIN SODIUM 40 MG: 40 INJECTION SUBCUTANEOUS at 08:26

## 2019-07-02 RX ADMIN — ISOSORBIDE DINITRATE 20 MG: 20 TABLET ORAL at 17:00

## 2019-07-02 RX ADMIN — METOPROLOL TARTRATE 50 MG: 50 TABLET, FILM COATED ORAL at 21:25

## 2019-07-02 RX ADMIN — PIPERACILLIN SODIUM AND TAZOBACTAM SODIUM 3.38 G: 36; 4.5 INJECTION, POWDER, FOR SOLUTION INTRAVENOUS at 23:04

## 2019-07-02 RX ADMIN — METOPROLOL TARTRATE 50 MG: 50 TABLET, FILM COATED ORAL at 08:27

## 2019-07-02 RX ADMIN — PIPERACILLIN SODIUM AND TAZOBACTAM SODIUM 3.38 G: 36; 4.5 INJECTION, POWDER, FOR SOLUTION INTRAVENOUS at 16:51

## 2019-07-02 RX ADMIN — ACETAMINOPHEN 650 MG: 325 TABLET ORAL at 08:27

## 2019-07-02 RX ADMIN — OXYCODONE HYDROCHLORIDE 5 MG: 5 TABLET ORAL at 21:25

## 2019-07-02 RX ADMIN — PAROXETINE 30 MG: 20 TABLET, FILM COATED ORAL at 08:27

## 2019-07-02 RX ADMIN — PIPERACILLIN SODIUM AND TAZOBACTAM SODIUM 3.38 G: 36; 4.5 INJECTION, POWDER, FOR SOLUTION INTRAVENOUS at 11:30

## 2019-07-02 RX ADMIN — ISOSORBIDE DINITRATE 20 MG: 20 TABLET ORAL at 21:25

## 2019-07-02 RX ADMIN — CILOSTAZOL 100 MG: 50 TABLET ORAL at 17:00

## 2019-07-02 NOTE — PLAN OF CARE
Problem: Potential for Falls  Goal: Patient will remain free of falls  Description  INTERVENTIONS:  - Assess patient frequently for physical needs  -  Identify cognitive and physical deficits and behaviors that affect risk of falls  -  [place] fall precautions as indicated by assessment   - Educate patient/family on patient safety including physical limitations  - Instruct patient to call for assistance with activity based on assessment  - Modify environment to reduce risk of injury  - Consider OT/PT consult to assist with strengthening/mobility  Outcome: Progressing     Problem: Nutrition/Hydration-ADULT  Goal: Nutrient/Hydration intake appropriate for improving, restoring or maintaining nutritional needs  Description  Monitor and assess patient's nutrition/hydration status for malnutrition (ex- brittle hair, bruises, dry skin, pale skin and conjunctiva, muscle wasting, smooth red tongue, and disorientation)  Collaborate with interdisciplinary team and initiate plan and interventions as ordered  Monitor patient's weight and dietary intake as ordered or per policy  Utilize nutrition screening tool and intervene per policy  Determine patient's food preferences and provide high-protein, high-caloric foods as appropriate       INTERVENTIONS:  - Monitor oral intake, urinary output, labs, and treatment plans  - Assess nutrition and hydration status and recommend course of action  - Evaluate amount of meals eaten  - Assist patient with eating if necessary   - Allow adequate time for meals  - Recommend/ encourage appropriate diets, oral nutritional supplements, and vitamin/mineral supplements  - Order, calculate, and assess calorie counts as needed  - Recommend, monitor, and adjust tube feedings and TPN/PPN based on assessed needs  - Assess need for intravenous fluids  - Provide specific nutrition/hydration education as appropriate  - Include patient/family/caregiver in decisions related to nutrition  Outcome: Progressing

## 2019-07-02 NOTE — PROGRESS NOTES
[address] [facility] Internal Medicine Progress Note  Patient: Allison Berger 71 y o  female   MRN: [id number]  PCP: No primary care provider on file  Unit/Bed#: E2 -01 Encounter: [id number]  Date Of Visit: 07/02/19      Assessment/plan  1  Sepsis due to pyelonephritis- appreciate urology recommendations  One blood culture out of 2 is positive for e coli  Pt was on ceftriaxone  She was changed to zosyn  Will await further cultures to make any further changes  2  Left hydronephrosis with out calculi- s/p cysto with stent  3  Elevated creatinine- possible baseline vrs alexandre  Slight increase in creatinine this am  Will check bmp in am  Avoid hypotension  Will decrease imdur to 20mg  4  Anxiety- continue current treatment    5  Elevated glucose likely due to infection  a1c is 6 1  Continue diabetic diet  6  Hyperlipidemia- continue statin  7  htn- lower bp at times  Will decrease imdur to 20mg  Continue hold parameters  Continue to hold lisinopril  dispo- daughter at bedside and updated her    Subjective:   Pt seen and examined  Pt had a tmax of 101  She denies any fevers now  No chills  No abd pain  No cp no sob no n/v/d     Objective:     Vitals: Blood pressure 105/58, pulse 73, temperature 97 6 °F (36 4 °C), temperature source Tympanic, resp  rate 18, height 5' 11" (1 803 m), weight 91 kg (200 lb 9 9 oz), SpO2 92 %  ,Body mass index is 27 98 kg/m²      Lab, Imaging and other studies:  Results from last 7 days   Lab Units 07/02/19  0426   WBC Thousand/uL 11 92*   HEMOGLOBIN g/dL 9 2*   HEMATOCRIT % 29 7*   PLATELETS Thousands/uL 253     Results from last 7 days   Lab Units 07/02/19  0426 06/30/19  2358   POTASSIUM mmol/L 3 4* 3 6   CHLORIDE mmol/L 103 99*   CO2 mmol/L 23 21   BUN mg/dL 17 16   CREATININE mg/dL 1 52* 1 42*   CALCIUM mg/dL 8 2* 9 5   ALK PHOS U/L  --  137*   ALT U/L  --  19   AST U/L  --  13         Lab Results   Component Value Date    BLOODCX No Growth at 24 hrs  07/01/2019 BLOODCX Escherichia coli (A) 07/01/2019    URINECX >100,000 cfu/ml Gram Negative Brady Enteric Like (A) 07/01/2019     Scheduled Meds:   Current Facility-Administered Medications:  acetaminophen 650 mg Oral Q6H PRN Jorge Alberto Colon MD    aluminum-magnesium hydroxide-simethicone 30 mL Oral Q6H PRN Jorge Alberto Colon MD    atorvastatin 80 mg Oral Daily With Bhaskar Mcwilliams MD    cilostazol 100 mg Oral BID Jorge Alberto Colon MD    enoxaparin 40 mg Subcutaneous Daily Jorge Alberto Colon MD    isosorbide dinitrate 30 mg Oral 4x Daily Jorge Alberto Colon MD    metoprolol tartrate 50 mg Oral Q12H [facility] Jorge Alberto Colon MD    morphine injection 2 mg Intravenous Q4H PRN Jorge Alberto Colon MD    ondansetron 4 mg Intravenous Q4H PRN Jorge Alberto Colon MD    oxyCODONE 5 mg Oral Q4H PRN Jorge Alberto Colon MD    oxyCODONE-acetaminophen 1 tablet Oral Q4H PRN Jorge Alberto Colon MD    pantoprazole 40 mg Oral Early Morning Jorge Alberto Colon MD    PARoxetine 30 mg Oral QAM Jorge Alberto Colon MD    piperacillin-tazobactam 3 375 g Intravenous Q6H Mere Em PA-C Last Rate: 3 375 g (07/02/19 1130)   polyethylene glycol 17 g Oral Daily PRN Jorge Alberto Colon MD    sodium chloride 100 mL/hr Intravenous Continuous Mary Fall PA-C Last Rate: 100 mL/hr (07/01/19 1913)   traMADol 50 mg Oral Q6H PRN Jorge Alberto Colon MD      Continuous Infusions:   sodium chloride 100 mL/hr Last Rate: 100 mL/hr (07/01/19 1913)     PRN Meds:   acetaminophen    aluminum-magnesium hydroxide-simethicone    morphine injection    ondansetron    oxyCODONE    oxyCODONE-acetaminophen    polyethylene glycol    traMADol      Physical exam:  Physical Exam  General appearance: alert and oriented, in no acute distress  Head: Normocephalic, without obvious abnormality, atraumatic  Eyes: conjunctivae/corneas clear  PERRL, EOM's intact  Fundi benign    Neck: no adenopathy, no carotid bruit, no JVD, supple, symmetrical, trachea midline and thyroid not enlarged, symmetric, no tenderness/mass/nodules  Lungs: clear to auscultation bilaterally  Heart: regular rate and rhythm, S1, S2 normal, no murmur, click, rub or gallop  Abdomen: soft, non-tender; bowel sounds normal; no masses,  no organomegaly  Extremities: extremities normal, warm and well-perfused; no cyanosis, clubbing, or edema  Pulses: 2+ and symmetric  Skin: Skin color, texture, turgor normal  No rashes or lesions  Neurologic: Mental status: Alert, oriented, thought content appropriate      VTE Pharmacologic Prophylaxis: Enoxaparin (Lovenox)  VTE Mechanical Prophylaxis: sequential compression device    Counseling / Coordination of Care  Total floor / unit time spent today 20 minutes     Current Length of Stay: 1 day(s)    Current Patient Status: Inpatient       Code Status: Level 1 - Full Code

## 2019-07-02 NOTE — PROGRESS NOTES
Progress Note - Urology  [facility] 1950, 71 y o  female MRN: [id number]    Unit/Bed#: E2 -01 Encounter: [id number]    Pyelonephritis  Assessment & Plan  1 Day Post-Op  Procedure(s):  CYSTOSCOPY RETROGRADE PYELOGRAM WITH INSERTION STENT URETERAL  Surgeon(s):  Queta Gee MD  7/1/2019    Positive blood in urine cultures for gram-negative rods  Ceftriaxone coverage  Persistently febrile to 101  Maintain Payton catheter into fever free 24 hours  He the patient urology follow-up in [place] for stent management and evaluation of left ureteral stricture x2    * Sepsis ([facility] [address] )  Assessment & Plan  With tachycardia, tachypnea, leukocytosis of 17, white blood cell improved to 11 9 to today  Source- urinary tract infection, pyelonephritis; blood and urine cultures positive for gram-negative rods, left ureter with proximal and distal stricture, status post stenting, with purulence urine from collecting system  Current coverage with ceftriaxone  Recommend ongoing Payton catheter decompression until afebrile for 24 hours  Antibiotics per Internal Medicine  Patient is from [place], and was planning to return to [place] on Thursday, 7/4/2019  I discussed with her the need for outpatient follow-up with Urology for ureteroscopy with possible dilation and stent management  Discussed with Dr Miguel Peter  Subjective/Objective     Subjective:   Sam Mchugh reports she feels well today  She has no nausea or vomiting, no flank pain  She is tolerating her Payton catheter well  Has been draining well, clear yellow urine  She was febrile overnight intermittently to 101  Her blood cultures were positive for gram-negative rods and her preliminary urine culture the same  Repeat urine culture from left renal collecting system is pending  She denies any irritation from the stent and is tolerating both the stent in the Payton catheter well      Review of Systems   Constitutional: Negative for activity change and appetite change  HENT: Negative for congestion and ear pain  Eyes: Negative for pain  Respiratory: Negative for cough and shortness of breath  Cardiovascular: Negative for chest pain and palpitations  Gastrointestinal: Negative for abdominal distention, abdominal pain, blood in stool, constipation, diarrhea and nausea  Genitourinary: Negative for difficulty urinating, dysuria, flank pain and hematuria  Musculoskeletal: Negative for arthralgias and myalgias  Skin: Negative for rash  Allergic/Immunologic: Negative for immunocompromised state  Neurological: Negative for dizziness and headaches  Hematological: Negative for adenopathy  Does not bruise/bleed easily  Psychiatric/Behavioral: Negative for agitation  The patient is not nervous/anxious  Objective:  Vitals: Blood pressure 127/57, pulse 99, temperature (!) 101 °F (38 3 °C), temperature source Tympanic, resp  rate 18, height 5' 11" (1 803 m), weight 91 kg (200 lb 9 9 oz), SpO2 92 %  ,Body mass index is 27 98 kg/m²  Intake/Output Summary (Last 24 hours) at 7/2/2019 1110  Last data filed at 7/2/2019 2595  Gross per 24 hour   Intake 1670 ml   Output 1050 ml   Net 620 ml     Invasive Devices     Peripheral Intravenous Line            Peripheral IV 07/01/19 Right Antecubital less than 1 day          Drain            Ureteral Drain/Stent Left ureter 6 Fr  less than 1 day    Urethral Catheter Latex 16 Fr  less than 1 day                Physical Exam   Constitutional: She is oriented to person, place, and time  She appears well-developed and well-nourished  She is cooperative  She does not appear ill  No distress  68-year-old female, lying comfortably in bed  Sleeping soundly  Easily arousable the sound of her name  HENT:   Head: Normocephalic and atraumatic  Moist mucous membranes  Eyes: Conjunctivae and EOM are normal    Neck: Normal range of motion  Neck supple  No tracheal deviation present     Cardiovascular: Normal rate, regular rhythm and normal heart sounds  No murmur heard  Pulmonary/Chest: Effort normal and breath sounds normal  No respiratory distress  She has no wheezes  Good airflow bilaterally on deep inspiration  Abdominal: Soft  Bowel sounds are normal  She exhibits no distension and no mass  There is no tenderness  Abdomen obese soft and benign without tenderness to deep palpation  Genitourinary:   Genitourinary Comments: Payton catheter draining clear yellow urine  Musculoskeletal: Normal range of motion  She exhibits no edema  Neurological: She is alert and oriented to person, place, and time  Skin: Skin is warm and dry  No rash noted  She is not diaphoretic  No erythema  No pallor  Psychiatric: She has a normal mood and affect  Her behavior is normal  Judgment and thought content normal    Nursing note and vitals reviewed  History:    Past Medical History:   Diagnosis Date    Asthma     Diabetes mellitus ([address] )     Hyperlipidemia     Hypertension      Past Surgical History:   Procedure Laterality Date    FL RETROGRADE PYELOGRAM  7/1/2019    [place] CYSTOURETHROSCOPY,URETER CATHETER Left 7/1/2019    Procedure: CYSTOSCOPY RETROGRADE PYELOGRAM WITH INSERTION STENT URETERAL;  Surgeon: Sary Garcia MD;  Location: [facility] OR;  Service: Urology     History reviewed  No pertinent family history    Social History     Socioeconomic History    Marital status: Unknown     Spouse name: None    Number of children: None    Years of education: None    Highest education level: None   Occupational History    None   Social Needs    Financial resource strain: None    Food insecurity:     Worry: None     Inability: None    Transportation needs:     Medical: None     Non-medical: None   Tobacco Use    Smoking status: Former Smoker    Smokeless tobacco: Never Used   Substance and Sexual Activity    Alcohol use: Not Currently    Drug use: Never    Sexual activity: None   Lifestyle    Physical activity: Days per week: None     Minutes per session: None    Stress: None   Relationships    Social connections:     Talks on phone: None     Gets together: None     Attends Yazidi service: None     Active member of club or organization: None     Attends meetings of clubs or organizations: None     Relationship status: None    Intimate partner violence:     Fear of current or ex partner: None     Emotionally abused: None     Physically abused: None     Forced sexual activity: None   Other Topics Concern    None   Social History Narrative    None       Labs:  Recent Labs     06/30/19  2316 07/02/19  0426   WBC 17 47* 11 92*     Recent Labs     06/30/19  2316 07/02/19  0426   HGB 12 0 9 2*       Recent Labs     06/30/19  2358 07/02/19  0426   CREATININE 1 42* 1 52*         Daniel Mckinney PA-C  Date: 7/2/2019 Time: 11:10 AM

## 2019-07-03 VITALS
HEART RATE: 80 BPM | RESPIRATION RATE: 18 BRPM | HEIGHT: 71 IN | BODY MASS INDEX: 28.09 KG/M2 | WEIGHT: 200.62 LBS | OXYGEN SATURATION: 97 % | SYSTOLIC BLOOD PRESSURE: 122 MMHG | DIASTOLIC BLOOD PRESSURE: 83 MMHG | TEMPERATURE: 97.7 F

## 2019-07-03 PROBLEM — A41.9 SEPSIS (HCC): Status: RESOLVED | Noted: 2019-07-01 | Resolved: 2019-07-03

## 2019-07-03 PROBLEM — N13.4 HYDROURETER: Status: RESOLVED | Noted: 2019-06-30 | Resolved: 2019-07-03

## 2019-07-03 PROBLEM — N12 PYELONEPHRITIS: Status: RESOLVED | Noted: 2019-07-01 | Resolved: 2019-07-03

## 2019-07-03 PROBLEM — R79.89 ELEVATED SERUM CREATININE: Status: RESOLVED | Noted: 2019-07-01 | Resolved: 2019-07-03

## 2019-07-03 PROBLEM — R73.09 ELEVATED GLUCOSE: Status: RESOLVED | Noted: 2019-07-01 | Resolved: 2019-07-03

## 2019-07-03 LAB
ANION GAP SERPL CALCULATED.3IONS-SCNC: 12 MMOL/L (ref 4–13)
BACTERIA BLD CULT: ABNORMAL
BACTERIA UR CULT: ABNORMAL
BUN SERPL-MCNC: 12 MG/DL (ref 5–25)
C DIFF TOX GENS STL QL NAA+PROBE: NORMAL
CALCIUM SERPL-MCNC: 8.7 MG/DL (ref 8.3–10.1)
CHLORIDE SERPL-SCNC: 107 MMOL/L (ref 100–108)
CO2 SERPL-SCNC: 24 MMOL/L (ref 21–32)
CREAT SERPL-MCNC: 1.34 MG/DL (ref 0.6–1.3)
ERYTHROCYTE [DISTWIDTH] IN BLOOD BY AUTOMATED COUNT: 14 % (ref 11.6–15.1)
GFR SERPL CREATININE-BSD FRML MDRD: 47 ML/MIN/1.73SQ M
GLUCOSE SERPL-MCNC: 121 MG/DL (ref 65–140)
GRAM STN SPEC: ABNORMAL
HCT VFR BLD AUTO: 32.2 % (ref 34.8–46.1)
HGB BLD-MCNC: 9.9 G/DL (ref 11.5–15.4)
MCH RBC QN AUTO: 31.4 PG (ref 26.8–34.3)
MCHC RBC AUTO-ENTMCNC: 30.7 G/DL (ref 31.4–37.4)
MCV RBC AUTO: 102 FL (ref 82–98)
PLATELET # BLD AUTO: 193 THOUSANDS/UL (ref 149–390)
PMV BLD AUTO: 10.7 FL (ref 8.9–12.7)
POTASSIUM SERPL-SCNC: 3.5 MMOL/L (ref 3.5–5.3)
RBC # BLD AUTO: 3.15 MILLION/UL (ref 3.81–5.12)
SODIUM SERPL-SCNC: 143 MMOL/L (ref 136–145)
WBC # BLD AUTO: 9.68 THOUSAND/UL (ref 4.31–10.16)

## 2019-07-03 PROCEDURE — 99232 SBSQ HOSP IP/OBS MODERATE 35: CPT | Performed by: PHYSICIAN ASSISTANT

## 2019-07-03 PROCEDURE — 80048 BASIC METABOLIC PNL TOTAL CA: CPT | Performed by: INTERNAL MEDICINE

## 2019-07-03 PROCEDURE — 99239 HOSP IP/OBS DSCHRG MGMT >30: CPT | Performed by: INTERNAL MEDICINE

## 2019-07-03 PROCEDURE — 85027 COMPLETE CBC AUTOMATED: CPT | Performed by: INTERNAL MEDICINE

## 2019-07-03 RX ORDER — CEPHALEXIN 500 MG/1
500 CAPSULE ORAL EVERY 8 HOURS SCHEDULED
Qty: 33 CAPSULE | Refills: 0 | Status: SHIPPED | OUTPATIENT
Start: 2019-07-03 | End: 2019-07-14

## 2019-07-03 RX ORDER — SACCHAROMYCES BOULARDII 250 MG
250 CAPSULE ORAL 2 TIMES DAILY
Status: DISCONTINUED | OUTPATIENT
Start: 2019-07-03 | End: 2019-07-03 | Stop reason: HOSPADM

## 2019-07-03 RX ORDER — SACCHAROMYCES BOULARDII 250 MG
250 CAPSULE ORAL 2 TIMES DAILY
Qty: 20 CAPSULE | Refills: 0 | Status: SHIPPED | OUTPATIENT
Start: 2019-07-03

## 2019-07-03 RX ORDER — LOPERAMIDE HYDROCHLORIDE 2 MG/1
2 CAPSULE ORAL EVERY 4 HOURS PRN
Qty: 30 CAPSULE | Refills: 0 | Status: SHIPPED | OUTPATIENT
Start: 2019-07-03

## 2019-07-03 RX ORDER — CEFAZOLIN SODIUM 2 G/50ML
2000 SOLUTION INTRAVENOUS EVERY 8 HOURS
Status: DISCONTINUED | OUTPATIENT
Start: 2019-07-03 | End: 2019-07-03 | Stop reason: HOSPADM

## 2019-07-03 RX ORDER — LOPERAMIDE HYDROCHLORIDE 2 MG/1
2 CAPSULE ORAL EVERY 4 HOURS PRN
Status: DISCONTINUED | OUTPATIENT
Start: 2019-07-03 | End: 2019-07-03 | Stop reason: HOSPADM

## 2019-07-03 RX ADMIN — PAROXETINE 40 MG: 20 TABLET, FILM COATED ORAL at 10:12

## 2019-07-03 RX ADMIN — CEFAZOLIN SODIUM 2000 MG: 2 SOLUTION INTRAVENOUS at 15:39

## 2019-07-03 RX ADMIN — ISOSORBIDE DINITRATE 20 MG: 20 TABLET ORAL at 11:46

## 2019-07-03 RX ADMIN — ATORVASTATIN CALCIUM 80 MG: 40 TABLET, FILM COATED ORAL at 15:39

## 2019-07-03 RX ADMIN — CILOSTAZOL 100 MG: 50 TABLET ORAL at 10:14

## 2019-07-03 RX ADMIN — METOPROLOL TARTRATE 50 MG: 50 TABLET, FILM COATED ORAL at 10:13

## 2019-07-03 RX ADMIN — CEFAZOLIN SODIUM 2000 MG: 2 SOLUTION INTRAVENOUS at 10:15

## 2019-07-03 RX ADMIN — ENOXAPARIN SODIUM 40 MG: 40 INJECTION SUBCUTANEOUS at 10:13

## 2019-07-03 RX ADMIN — LOPERAMIDE HYDROCHLORIDE 2 MG: 2 CAPSULE ORAL at 15:39

## 2019-07-03 RX ADMIN — PIPERACILLIN SODIUM AND TAZOBACTAM SODIUM 3.38 G: 36; 4.5 INJECTION, POWDER, FOR SOLUTION INTRAVENOUS at 05:48

## 2019-07-03 RX ADMIN — ISOSORBIDE DINITRATE 20 MG: 20 TABLET ORAL at 10:13

## 2019-07-03 RX ADMIN — Medication 250 MG: at 11:45

## 2019-07-03 RX ADMIN — PANTOPRAZOLE SODIUM 40 MG: 40 TABLET, DELAYED RELEASE ORAL at 05:48

## 2019-07-03 NOTE — PROGRESS NOTES
[facility] Progress Note  Patient: Ching Guajardo 71 y o  female   MRN: [id number]  PCP: No primary care provider on file  Unit/Bed#: E2 -01 Encounter: [id number]  Date Of Visit: 07/03/19      Assessment/plan  1  Sepsis due to pyelonephritis- appreciate urology recommendations  One blood culture out of 2 is positive for e coli  Pt was on ancef  She was changed to zosyn  Blood culture shows sensitivities to ancef  Pt was changed to ancef  If she remains afebrile she will be changed to keflex to complete the course  She is currently on day 3/14       2  Left hydronephrosis with out calculi- s/p cysto with stent  Okay for voiding trial today  She will need to follow up with urology in [facility]       3  ckd3- likely at baseline       4  Anxiety- continue current treatment     5  Hyperlipidemia- continue statin       6  htn- bp improved with decreasing imdur to 20mg and holding lisinopril    7  Diarrhea- c diff negative  Will start probiotics and imodium  dispo- called her daughter and left vm   possible d/c later today vrs tomorrow am      Subjective:   Pt seen and examined  Pt last fever was 101 yesterday am  No fever for 24 hours  She is still having diarrhea  No other problems  No chills  No cp no sob no n/v no abd pain  She is anxious about being able to fly back to [facility]  Did discuss with her the importance of following up with urology asap in [facility]  Objective:     Vitals: Blood pressure 142/70, pulse (!) 110, temperature 99 5 °F (37 5 °C), temperature source Tympanic, resp  rate 18, height 5' 11" (1 803 m), weight 91 kg (200 lb 9 9 oz), SpO2 97 %  ,Body mass index is 27 98 kg/m²      Lab, Imaging and other studies:  Results from last 7 days   Lab Units 07/03/19  0525   WBC Thousand/uL 9 68   HEMOGLOBIN g/dL 9 9*   HEMATOCRIT % 32 2*   PLATELETS Thousands/uL 193     Results from last 7 days   Lab Units 07/03/19  0525  06/30/19  2358   POTASSIUM mmol/L 3 5   < > 3 6   CHLORIDE mmol/L 107   < > 99*   CO2 mmol/L 24   < > 21   BUN mg/dL 12   < > 16   CREATININE mg/dL 1 34*   < > 1 42*   CALCIUM mg/dL 8 7   < > 9 5   ALK PHOS U/L  --   --  137*   ALT U/L  --   --  19   AST U/L  --   --  13    < > = values in this interval not displayed  Lab Results   Component Value Date    BLOODCX No Growth at 48 hrs  07/01/2019    BLOODCX Escherichia coli (A) 07/01/2019    URINECX Culture results to follow   07/01/2019    URINECX >100,000 cfu/ml Gram Negative Brady Enteric Like (A) 07/01/2019     Scheduled Meds:   Current Facility-Administered Medications:  acetaminophen 650 mg Oral Q6H PRN Hannah Patel MD    aluminum-magnesium hydroxide-simethicone 30 mL Oral Q6H PRN Hannah Patel MD    atorvastatin 80 mg Oral Daily With Jamey Bobo MD    cefazolin 2,000 mg Intravenous Q8H Taina Mak DO Last Rate: 2,000 mg (07/03/19 1015)   cilostazol 100 mg Oral BID Hannah Patel MD    enoxaparin 40 mg Subcutaneous Daily Hannah Patel MD    isosorbide dinitrate 20 mg Oral 4x Daily Taina Mak DO    metoprolol tartrate 50 mg Oral Q12H [facility] Hannah Patel MD    morphine injection 2 mg Intravenous Q4H PRN Hannah Patel MD    ondansetron 4 mg Intravenous Q4H PRN Hannah Patel MD    oxyCODONE 5 mg Oral Q4H PRN Hannah Patel MD    oxyCODONE-acetaminophen 1 tablet Oral Q4H PRN Hannah Patel MD    pantoprazole 40 mg Oral Early Morning Hannah Patel MD    PARoxetine 30 mg Oral QAM Hannah Patel MD    polyethylene glycol 17 g Oral Daily PRN Hannah Patel MD    traMADol 50 mg Oral Q6H PRN Hannah Patel MD      Continuous Infusions:    PRN Meds:   acetaminophen    aluminum-magnesium hydroxide-simethicone    morphine injection    ondansetron    oxyCODONE    oxyCODONE-acetaminophen    polyethylene glycol    traMADol      Physical exam:  Physical Exam  General appearance: alert and oriented, in no acute distress  Head: Normocephalic, without obvious abnormality, atraumatic  Eyes: conjunctivae/corneas clear  PERRL, EOM's intact  Fundi benign    Neck: no adenopathy, no carotid bruit, no JVD, supple, symmetrical, trachea midline and thyroid not enlarged, symmetric, no tenderness/mass/nodules  Lungs: clear to auscultation bilaterally  Heart: regular rate and rhythm, S1, S2 normal, no murmur, click, rub or gallop  Abdomen: soft, non-tender; bowel sounds normal; no masses,  no organomegaly  Extremities: extremities normal, warm and well-perfused; no cyanosis, clubbing, or edema  Pulses: 2+ and symmetric  Skin: Skin color, texture, turgor normal  No rashes or lesions  Neurologic: Mental status: Alert, oriented, thought content appropriate      VTE Pharmacologic Prophylaxis: Enoxaparin (Lovenox)  VTE Mechanical Prophylaxis: sequential compression device    Counseling / Coordination of Care  Total floor / unit time spent today 20 minutes      Current Length of Stay: 2 day(s)    Current Patient Status: Inpatient       Code Status: Level 1 - Full Code

## 2019-07-03 NOTE — PROGRESS NOTES
Progress Note - Urology  Nataliya Bautista 1950, 71 y o  female MRN: [id number]    Unit/Bed#: E2 -01 Encounter: [id number]    Pyelonephritis  Assessment & Plan  2 Day Post-Op  Procedure(s):  CYSTOSCOPY RETROGRADE PYELOGRAM WITH INSERTION STENT URETERAL  Surgeon(s):  Vania Knapp MD  7/1/2019    One of two blood cultures without growth of E coli, urine culture E coli  Susceptible to cefazolin, continue  Now afebrile greater than 24 hours, last temp of 101° was 7:00 a m  Yesterday  Remove Payton catheter now afebrile  Urology follow-up in [place] for stent management and evaluation of left ureteral stricture x2    * Sepsis ([facility] [address] )  Assessment & Plan  With tachycardia, tachypnea, leukocytosis of 17, WBC down to 9 6 to today  Source- urinary tract infection, pyelonephritis; 1 of 2 blood culures and urine culture positive for E  Coli   Left ureter with proximal and distal stricture, status post stenting, with purulenct urine from collecting system    Current coverage with cefazolin, anticipate transition to cephalexin 14 days at discharge  Remove Payton catheter today, as patient afebrile now greater than 24 hours  Patient is from [place], and is planning to return to [place] tomorrow 7/4/2019  I discussed with her the need for outpatient follow-up with Urology at home for ureteroscopy with possible dilation as well as stent management  Bedside rounds performed with Edith Raymond RN  Discussed with Dr Joanie Romero  Urology will sign off, but remain available for further inpatient questions  Please do not hesitate to contact us with questions or concerns  Subjective:   HPI:  Continues to feel better each day  No pain or discomfort related to the left-sided stent  No issues with the Payton catheter overnight  She has now been afebrile since 7:00 a m  Yesterday she is very eager to go home, she has a plane ticket already purchased to go back home to [place]   Constitutional: Negative for activity change, appetite change, chills, fever and unexpected weight change  HENT: Negative  Respiratory: Negative  Negative for shortness of breath  Cardiovascular: Negative  Negative for chest pain  Gastrointestinal: Positive for abdominal pain  Negative for diarrhea, nausea and vomiting  Endocrine: Negative  Genitourinary: Positive for flank pain  Negative for decreased urine volume, difficulty urinating, dysuria, frequency, hematuria and urgency  Musculoskeletal: Negative for back pain and gait problem  Skin: Negative  Allergic/Immunologic: Negative  Neurological: Negative  Hematological: Negative for adenopathy  Does not bruise/bleed easily  Objective:  Nursing Rounds:  Feeling better, no longer having abdominal or flank pain  Angie Hale to have Payton catheter removed, wants to go back to [place], but her plan to get her tomorrow already    Vitals: Blood pressure 142/70, pulse (!) 110, temperature 99 5 °F (37 5 °C), temperature source Tympanic, resp  rate 18, height 5' 11" (1 803 m), weight 91 kg (200 lb 9 9 oz), SpO2 97 %  ,Body mass index is 27 98 kg/m²  Intake/Output Summary (Last 24 hours) at 7/3/2019 1154  Last data filed at 7/3/2019 1015  Gross per 24 hour   Intake 2346 67 ml   Output 2350 ml   Net -3 33 ml     Invasive Devices     Peripheral Intravenous Line            Peripheral IV 07/02/19 Left;Ventral (anterior) Forearm less than 1 day          Drain            Ureteral Drain/Stent Left ureter 6 Fr  1 day                Physical Exam   Constitutional: She is oriented to person, place, and time  She appears well-developed and well-nourished  No distress  Nontoxic   HENT:   Head: Normocephalic and atraumatic  Cardiovascular: Normal rate, regular rhythm and intact distal pulses  Pulmonary/Chest: Effort normal and breath sounds normal    Abdominal: Soft   Bowel sounds are normal    No suprapubic or CVA tenderness   Genitourinary:   Genitourinary Comments: Payton in place draining clear yellow urine   Musculoskeletal: She exhibits no edema  Neurological: She is alert and oriented to person, place, and time  Gait normal    Skin: Skin is warm  Capillary refill takes less than 2 seconds  She is not diaphoretic  Psychiatric: She has a normal mood and affect  Her speech is normal and behavior is normal    Nursing note and vitals reviewed  History:    Past Medical History:   Diagnosis Date    Asthma     Diabetes mellitus ([address] )     Hyperlipidemia     Hypertension      Past Surgical History:   Procedure Laterality Date    FL RETROGRADE PYELOGRAM  7/1/2019    [place] CYSTOURETHROSCOPY,URETER CATHETER Left 7/1/2019    Procedure: CYSTOSCOPY RETROGRADE PYELOGRAM WITH INSERTION STENT URETERAL;  Surgeon: Queta Gee MD;  Location: [facility];  Service: Urology     History reviewed  No pertinent family history    Social History     Socioeconomic History    Marital status: Unknown     Spouse name: None    Number of children: None    Years of education: None    Highest education level: None   Occupational History    None   Social Needs    Financial resource strain: None    Food insecurity:     Worry: None     Inability: None    Transportation needs:     Medical: None     Non-medical: None   Tobacco Use    Smoking status: Former Smoker    Smokeless tobacco: Never Used   Substance and Sexual Activity    Alcohol use: Not Currently    Drug use: Never    Sexual activity: None   Lifestyle    Physical activity:     Days per week: None     Minutes per session: None    Stress: None   Relationships    Social connections:     Talks on phone: None     Gets together: None     Attends Mormon service: None     Active member of club or organization: None     Attends meetings of clubs or organizations: None     Relationship status: None    Intimate partner violence:     Fear of current or ex partner: None     Emotionally abused: None     Physically abused: None     Forced sexual activity: None   Other Topics Concern    None   Social History Narrative    None         Labs:  Recent Labs     06/30/19  2316 07/02/19  0426 07/03/19  0525   WBC 17 47* 11 92* 9 68       Recent Labs     06/30/19  2316 07/02/19  0426 07/03/19  0525   HGB 12 0 9 2* 9 9*     Recent Labs     06/30/19  2316 07/02/19  0426 07/03/19  0525   HCT 37 6 29 7* 32 2*     Recent Labs     06/30/19  2358 07/02/19  0426 07/03/19  0525   CREATININE 1 42* 1 52* 1 34*     Blood culture #1 [[id number]] Collected: 07/01/19 0255   Lab Status: Preliminary result Specimen: Blood from Hand, Left Updated: 07/03/19 0801    Blood Culture No Growth at 48 hrs     Blood culture #2 [[id number]] (Abnormal)  Collected: 07/01/19 0252   Lab Status: Final result Specimen: Blood from Hand, Right Updated: 07/03/19 0727    Blood Culture Escherichia coliAbnormal     Gram Stain Result Gram negative rodsAbnormal    Susceptibility     Escherichia coli (1)     Antibiotic Interpretation Microscan Method Status    ZID Performed  Yes  KATERYNA Final    Amikacin ($$) Susceptible <=16 ug/ml KATERYNA Final    Amoxicillin + Clavulanate Intermediate 16/8 ug/ml KATERYNA Final    Ampicillin ($$) Resistant >16 00 ug/ml KATERYNA Final    Ampicillin + Sulbactam ($) Resistant >16/8 ug/ml KATERYNA Final    Aztreonam ($$$)  Susceptible <=4 ug/ml KATERYNA Final    Cefazolin ($) Susceptible 4 00 ug/ml KATERYNA Final    Ciprofloxacin ($)  Susceptible <=1 00 ug/ml KATERYNA Final    Ertapenem ($$$) Susceptible <=0 5 ug/ml KATERYNA Final    Gentamicin ($$) Resistant >8 ug/ml KATERYNA Final    Levofloxacin ($) Susceptible <=0 25 ug/ml KATERYNA Final    Tetracycline Resistant >8 ug/ml KATERYNA Final    Tobramycin ($) Susceptible 4 ug/ml KATERYNA Final    Trimethoprim + Sulfamethoxazole ($$$) Resistant >2/38 ug/ml KATERYNA Final          Urine culture [[id number]] (Abnormal)  Collected: 07/01/19 0134   Lab Status: Final result Specimen: Urine, Clean Catch Updated: 07/03/19 1035    Urine Culture >100,000 cfu/ml Escherichia coliAbnormal    Susceptibility     Escherichia coli (1)     Antibiotic Interpretation Microscan Method Status    ZID Performed  Yes  KATERYNA Final    Amikacin ($$) Susceptible <=16 ug/ml KATERYNA Final    Amoxicillin + Clavulanate Susceptible 8/4 ug/ml KATERYNA Final    Ampicillin ($$) Resistant >16 00 ug/ml KATERYNA Final    Ampicillin + Sulbactam ($) Intermediate 16/8 ug/ml KATERYNA Final    Aztreonam ($$$)  Susceptible <=4 ug/ml KATERYNA Final    Cefazolin ($) Susceptible 4 00 ug/ml KATERYNA Final    Ciprofloxacin ($)  Susceptible <=1 00 ug/ml KATERYNA Final    Ertapenem ($$$) Susceptible <=0 5 ug/ml KATERYNA Final    Gentamicin ($$) Resistant >8 ug/ml KATERYNA Final    Levofloxacin ($) Susceptible <=0 25 ug/ml KATERYNA Final    Nitrofurantoin Susceptible <=32 ug/ml KTAERYNA Final    Tetracycline Resistant >8 ug/ml KATERYNA Final    Tobramycin ($) Intermediate 8 ug/ml KATERYNA Final    Trimethoprim + Sulfamethoxazole ($$$) Resistant >2/38 ug/ml KATERYNA Final                Goldie Lovett PA-C  Date: 7/3/2019 Time: 11:54 AM

## 2019-07-03 NOTE — DISCHARGE SUMMARY
Discharge Summary - [facility]    Patient Information: Karissa Hair 71 y o  female MRN: [id number]  Unit/Bed#: E2 -01 Encounter: [id number]    Discharging Physician / Practitioner: Marcela Rivas DO  PCP: No primary care provider on file  Admission Date: 6/30/2019  Discharge Date: 07/03/19    Disposition:     Home     Reason for Admission: sepsis    Discharge Diagnoses:     Principal Problem (Resolved):    Sepsis ([address] )  Active Problems:    Hypertension    Hyperlipidemia    Anxiety    Hydronephrosis  Resolved Problems:    Pyelonephritis    Elevated serum creatinine    Elevated glucose    Hydroureter      Consultations During Hospital Stay:  · Urology     Procedures Performed:     · Cysto with stent placement    Significant Findings / Test Results:   Ct Abdomen Pelvis Wo Contrast  Result Date: 7/1/2019  Impression: Moderate left hydronephrosis and hydroureter with perinephric and periureteral stranding which may be seen in the setting of pyelonephritis  Incidental Findings:   · none    Test Results Pending at Discharge (will require follow up):   · none     Outpatient Tests Requested:  None    Hospital Course:     Karissa Hair is a 71 y o  female patient who originally presented to the hospital on 6/30/2019 due to sepsis due to pyelonephritis  She was assessed by urology  One blood culture out of 2 is positive for e coli  Pt was on ancef  She was changed to zosyn  Blood culture shows sensitivities to ancef  Pt was changed to ancef  She has remained afebrile  She will be changed to keflex to complete course      Pt had Left hydronephrosis with out calculi in which she is s/p cysto with stent  She passed her voiding trial  She will need to follow up with urology in [place]       She has ckd3 and is currently at baseline with her creatinine  Her blood pressure was a little lower but did improve with holding her lisinopril  Pt had diarrhea which was due to antibiotics   Her c diff was negative  She can continue with probiotics and imodium   Pt was discharged to home  She is flying to [place]  She was educated to follow up with a urologist and pcp as soon as possible       Discharge Day Visit / Exam:     * Please refer to separate progress note for these details *    Discharge instructions/Information to patient and family:   See after visit summary for information provided to patient and family  Provisions for Follow-Up Care:  See after visit summary for information related to follow-up care and any pertinent home health orders  Discharge Statement:  I spent 35 minutes discharging the patient  This time was spent on the day of discharge  I had direct contact with the patient on the day of discharge  Greater than 50% of the total time was spent examining patient, answering all patient questions, arranging and discussing plan of care with patient as well as directly providing post-discharge instructions  Additional time then spent on discharge activities  Discharge Medications:  See after visit summary for reconciled discharge medications provided to patient and family

## 2019-07-03 NOTE — NURSING NOTE
Antibiotic script sent down to pharmacy  IV discontinued at this time  Instructions reviewed with pt  There are no further questions at this time

## 2019-07-03 NOTE — SOCIAL WORK
LOS: 2 GMLOS: 5 7  NOT A READMISSION  NOT A BUNDLE    CM met with pt and family at bedside to discuss discharge needs  Pt lives in a house with her daughter in [place], [place]  Pt is currently on vacation visiting her family  PCP located in [place]; pt unable to remember name  Pharmacy identified as [facility]  Pt does drive and will have a ride at discharge  Pt denies STR hx; Reports VNA hx   Pt identified her POA's as her daughter and son  No needs expressed or identified  CM to follow

## 2019-07-06 LAB — BACTERIA BLD CULT: NORMAL

## (undated) DEVICE — GUIDEWIRE STRGHT TIP 0.035 IN  SOLO PLUS

## (undated) DEVICE — LUBRICANT SURGILUBE TUBE 4 OZ  FLIP TOP

## (undated) DEVICE — SCD SEQUENTIAL COMPRESSION COMFORT SLEEVE MEDIUM KNEE LENGTH: Brand: KENDALL SCD

## (undated) DEVICE — PACK TUR

## (undated) DEVICE — CATH FOLEY 16FR 5ML 2 WAY UNCOATED SILICONE

## (undated) DEVICE — SPECIMEN CONTAINER STERILE PEEL PACK

## (undated) DEVICE — TRANSPOSAL ULTRAFLEX DUO/QUAD ULTRA CART MANIFOLD

## (undated) DEVICE — UROCATCH BAG

## (undated) DEVICE — GLOVE SRG BIOGEL 7.5

## (undated) DEVICE — BAG URINE DRAINAGE 2000ML ANTI RFLX LF

## (undated) DEVICE — INVIEW CLEAR LEGGINGS: Brand: CONVERTORS

## (undated) DEVICE — TUBING SUCTION 5MM X 12 FT